# Patient Record
Sex: MALE | Race: WHITE | NOT HISPANIC OR LATINO | Employment: OTHER | URBAN - METROPOLITAN AREA
[De-identification: names, ages, dates, MRNs, and addresses within clinical notes are randomized per-mention and may not be internally consistent; named-entity substitution may affect disease eponyms.]

---

## 2018-12-04 ENCOUNTER — TELEPHONE (OUTPATIENT)
Dept: OTOLARYNGOLOGY | Facility: CLINIC | Age: 63
End: 2018-12-04

## 2018-12-30 ENCOUNTER — OFFICE VISIT (OUTPATIENT)
Dept: URGENT CARE | Facility: CLINIC | Age: 63
End: 2018-12-30

## 2018-12-30 VITALS
WEIGHT: 190 LBS | RESPIRATION RATE: 18 BRPM | HEIGHT: 72 IN | BODY MASS INDEX: 25.73 KG/M2 | HEART RATE: 82 BPM | TEMPERATURE: 98 F | OXYGEN SATURATION: 98 % | DIASTOLIC BLOOD PRESSURE: 82 MMHG | SYSTOLIC BLOOD PRESSURE: 138 MMHG

## 2018-12-30 DIAGNOSIS — J06.9 VIRAL URI WITH COUGH: Primary | ICD-10-CM

## 2018-12-30 PROCEDURE — 99203 OFFICE O/P NEW LOW 30 MIN: CPT | Mod: S$GLB,,, | Performed by: PHYSICIAN ASSISTANT

## 2018-12-30 RX ORDER — HYDROCHLOROTHIAZIDE 12.5 MG/1
CAPSULE ORAL
COMMUNITY
End: 2019-01-30 | Stop reason: SDUPTHER

## 2018-12-30 RX ORDER — BENZONATATE 100 MG/1
100 CAPSULE ORAL EVERY 6 HOURS PRN
Qty: 60 CAPSULE | Refills: 1 | Status: SHIPPED | OUTPATIENT
Start: 2018-12-30 | End: 2019-05-03

## 2018-12-30 RX ORDER — AZELASTINE 1 MG/ML
1 SPRAY, METERED NASAL 2 TIMES DAILY
Qty: 30 ML | Refills: 0 | Status: SHIPPED | OUTPATIENT
Start: 2018-12-30 | End: 2019-05-03

## 2018-12-30 NOTE — PATIENT INSTRUCTIONS
Symptomatic treatment:    Alternate Tylenol and Ibuprofen every 3 hrs  salt water gargles to soothe throat  Honey/lemon water to soothe throat  Cold-eeze helps to reduce the duration of URI symptoms  Elderberry to reduce duration of URI symptoms  Cepachol helps to numb the discomfort in throat  Nasal saline spray reduces inflammation and dryness  Warm face compresses/hot showers as often as you can to open sinuses   Vicks vapor rub at night  Flonase OTC or Nasacort OTC  Simple foods like chicken noodle soup help hydrate  Delsym helps with coughing at night  Zyrtec/Claritin during the day and Benadryl at night may help if allergy component   Zantac will help if there is reflux from the post nasal drip  Rest as much as you can  Your symptoms will likely last 7-10 days, maybe longer depending on how it affects your body.  You are contagious 7-10, so minimize contact with others to reduce the spread to others and stay home from work or school as we discussed. Dehydration is preventable but is one of the main reasons why you will feel so badly. Drink pedialyte, gatorade or propel. Stay hydrated.  Antibiotics are not needed unless a complication(such as Otitis Media, Bacterial sinus infection or pneumonia). Taking antibiotics for Flu/Cold is not supported by evidencebased medicine and can expose you to unnecessary side effects of the medication, such as anaphylaxis.   If you experience any:  Chest pain, shortness of breath, wheezing or difficulty breathing  Severe headache, face, neck or ear pain  New rash  Fever over 101.5º F (38.6 C) for more than three days  Confusion, behavior change or seizure  Severe weakness or dizziness  Go to ER

## 2018-12-30 NOTE — PROGRESS NOTES
Subjective:       Patient ID: Malcom Ho is a 63 y.o. male.    Vitals:  height is 6' (1.829 m) and weight is 86.2 kg (190 lb). His oral temperature is 98.1 °F (36.7 °C). His blood pressure is 138/82 and his pulse is 82. His respiration is 18 and oxygen saturation is 98%.     Chief Complaint: Sore Throat    Pt states off and on cough but sore throat worse      Sore Throat    This is a new problem. The current episode started yesterday. The problem has been gradually worsening. The maximum temperature recorded prior to his arrival was 101 - 101.9 F. Associated symptoms include congestion, coughing and headaches. Pertinent negatives include no ear pain, shortness of breath, stridor or vomiting. He has tried acetaminophen for the symptoms. The treatment provided no relief.       Constitution: Positive for fever. Negative for chills, sweating and fatigue.   HENT: Positive for congestion and sore throat. Negative for ear pain, sinus pain, sinus pressure and voice change.    Neck: Negative for painful lymph nodes.   Eyes: Negative for eye redness.   Respiratory: Positive for cough and sputum production. Negative for chest tightness, bloody sputum, COPD, shortness of breath, stridor, wheezing and asthma.    Gastrointestinal: Negative for nausea and vomiting.   Musculoskeletal: Negative for muscle ache.   Skin: Negative for rash.   Allergic/Immunologic: Negative for seasonal allergies and asthma.   Neurological: Positive for headaches.   Hematologic/Lymphatic: Negative for swollen lymph nodes.       Objective:      Physical Exam   Constitutional: He is oriented to person, place, and time. He appears well-developed and well-nourished. He is cooperative.  Non-toxic appearance. He does not appear ill. No distress.   HENT:   Head: Normocephalic and atraumatic.   Right Ear: Hearing, external ear and ear canal normal. A middle ear effusion (Clear) is present.   Left Ear: Hearing, external ear and ear canal normal. A middle  ear effusion (Clear) is present.   Nose: Nose normal. No mucosal edema, rhinorrhea or nasal deformity. No epistaxis. Right sinus exhibits no maxillary sinus tenderness and no frontal sinus tenderness. Left sinus exhibits no maxillary sinus tenderness and no frontal sinus tenderness.   Mouth/Throat: Uvula is midline and mucous membranes are normal. No trismus in the jaw. Normal dentition. No uvula swelling. Posterior oropharyngeal erythema present.   Eyes: Conjunctivae and lids are normal. No scleral icterus.   Sclera clear bilat   Neck: Trachea normal, full passive range of motion without pain and phonation normal. Neck supple.   Cardiovascular: Normal rate, regular rhythm, normal heart sounds, intact distal pulses and normal pulses.   Pulmonary/Chest: Effort normal and breath sounds normal. No respiratory distress.   Abdominal: Soft. Normal appearance and bowel sounds are normal. He exhibits no distension. There is no tenderness.   Musculoskeletal: Normal range of motion. He exhibits no edema or deformity.   Neurological: He is alert and oriented to person, place, and time. He exhibits normal muscle tone. Coordination normal.   Skin: Skin is warm, dry and intact. He is not diaphoretic. No pallor.   Psychiatric: He has a normal mood and affect. His speech is normal and behavior is normal. Judgment and thought content normal. Cognition and memory are normal.   Nursing note and vitals reviewed.      Assessment:       1. Viral URI with cough        Plan:         Viral URI with cough    Other orders  -     azelastine (ASTELIN) 137 mcg (0.1 %) nasal spray; 1 spray (137 mcg total) by Nasal route 2 (two) times daily.  Dispense: 30 mL; Refill: 0  -     benzonatate (TESSALON PERLES) 100 MG capsule; Take 1 capsule (100 mg total) by mouth every 6 (six) hours as needed.  Dispense: 60 capsule; Refill: 1     Advised to contact me in 1 week if symptoms have not improved.  Patient has glaucoma so we will refrain from steroid  injection.  Discussed symptomatic relief and reasons to return to clinic.    You must understand that you've received an Urgent Care treatment only and that you may be released before all your medical problems are known or treated. You, the patient, will arrange for follow up care as instructed.  Follow up with your PCP or specialty clinic as directed in the next 1-2 weeks if not improved or as needed.  You can call [phone] to schedule an appointment with the appropriate provider.  If your condition worsens we recommend that you receive another evaluation at the emergency room immediately or contact your primary medical clinics after hours call service to discuss your concerns.  Please return here or go to the Emergency Department for any concerns or worsening of condition.

## 2019-01-02 ENCOUNTER — TELEPHONE (OUTPATIENT)
Dept: URGENT CARE | Facility: CLINIC | Age: 64
End: 2019-01-02

## 2019-01-02 NOTE — TELEPHONE ENCOUNTER
Pt states his symptoms are worst. He could not go to work today.  He states he will come in to be re-evaluated today     He states he still have fever, cough and blood in his mucus when he blow is nose

## 2019-01-03 ENCOUNTER — TELEPHONE (OUTPATIENT)
Dept: URGENT CARE | Facility: CLINIC | Age: 64
End: 2019-01-03

## 2019-01-03 RX ORDER — AMOXICILLIN AND CLAVULANATE POTASSIUM 875; 125 MG/1; MG/1
1 TABLET, FILM COATED ORAL 2 TIMES DAILY
Qty: 20 TABLET | Refills: 0 | Status: SHIPPED | OUTPATIENT
Start: 2019-01-03 | End: 2019-01-13

## 2019-03-25 ENCOUNTER — OFFICE VISIT (OUTPATIENT)
Dept: OTOLARYNGOLOGY | Facility: CLINIC | Age: 64
End: 2019-03-25
Payer: COMMERCIAL

## 2019-03-25 ENCOUNTER — CLINICAL SUPPORT (OUTPATIENT)
Dept: AUDIOLOGY | Facility: CLINIC | Age: 64
End: 2019-03-25
Payer: COMMERCIAL

## 2019-03-25 VITALS — BODY MASS INDEX: 25.2 KG/M2 | WEIGHT: 186.06 LBS | HEIGHT: 72 IN

## 2019-03-25 DIAGNOSIS — H90.3 SENSORINEURAL HEARING LOSS, BILATERAL: ICD-10-CM

## 2019-03-25 DIAGNOSIS — H81.8X9 OTHER DISORDERS OF VESTIBULAR FUNCTION, UNSPECIFIED EAR: Primary | ICD-10-CM

## 2019-03-25 DIAGNOSIS — H81.319 AUDITORY VERTIGO, UNSPECIFIED LATERALITY: Primary | ICD-10-CM

## 2019-03-25 DIAGNOSIS — H90.3 SENSORINEURAL HEARING LOSS (SNHL) OF BOTH EARS: Primary | ICD-10-CM

## 2019-03-25 DIAGNOSIS — H93.19 TINNITUS, UNSPECIFIED LATERALITY: ICD-10-CM

## 2019-03-25 DIAGNOSIS — R26.89 BALANCE PROBLEM: ICD-10-CM

## 2019-03-25 PROCEDURE — 99999 PR PBB SHADOW E&M-EST. PATIENT-LVL III: ICD-10-PCS | Mod: PBBFAC,,, | Performed by: OTOLARYNGOLOGY

## 2019-03-25 PROCEDURE — 92540 BASIC VESTIBULAR EVALUATION: CPT | Mod: S$GLB,,, | Performed by: AUDIOLOGIST

## 2019-03-25 PROCEDURE — 92557 COMPREHENSIVE HEARING TEST: CPT | Mod: S$GLB,,, | Performed by: AUDIOLOGIST

## 2019-03-25 PROCEDURE — 92567 TYMPANOMETRY: CPT | Mod: S$GLB,,, | Performed by: AUDIOLOGIST

## 2019-03-25 PROCEDURE — 3008F PR BODY MASS INDEX (BMI) DOCUMENTED: ICD-10-PCS | Mod: CPTII,S$GLB,, | Performed by: OTOLARYNGOLOGY

## 2019-03-25 PROCEDURE — 3008F BODY MASS INDEX DOCD: CPT | Mod: CPTII,S$GLB,, | Performed by: OTOLARYNGOLOGY

## 2019-03-25 PROCEDURE — 92557 PR COMPREHENSIVE HEARING TEST: ICD-10-PCS | Mod: S$GLB,,, | Performed by: AUDIOLOGIST

## 2019-03-25 PROCEDURE — 92540 PR VESTIBULAR EVAL NYSTAG FOVL&PERPH STIM OSCIL TRACKING: ICD-10-PCS | Mod: S$GLB,,, | Performed by: AUDIOLOGIST

## 2019-03-25 PROCEDURE — 92537 PR CALORIC VSTBLR TEST W/REC BITHERMAL: ICD-10-PCS | Mod: S$GLB,,, | Performed by: AUDIOLOGIST

## 2019-03-25 PROCEDURE — 92537 CALORIC VSTBLR TEST W/REC: CPT | Mod: S$GLB,,, | Performed by: AUDIOLOGIST

## 2019-03-25 PROCEDURE — 99205 OFFICE O/P NEW HI 60 MIN: CPT | Mod: S$GLB,,, | Performed by: OTOLARYNGOLOGY

## 2019-03-25 PROCEDURE — 92567 PR TYMPA2METRY: ICD-10-PCS | Mod: S$GLB,,, | Performed by: AUDIOLOGIST

## 2019-03-25 PROCEDURE — 99205 PR OFFICE/OUTPT VISIT, NEW, LEVL V, 60-74 MIN: ICD-10-PCS | Mod: S$GLB,,, | Performed by: OTOLARYNGOLOGY

## 2019-03-25 PROCEDURE — 99999 PR PBB SHADOW E&M-EST. PATIENT-LVL III: CPT | Mod: PBBFAC,,, | Performed by: OTOLARYNGOLOGY

## 2019-03-25 NOTE — PROGRESS NOTES
VNG Evaluation    Referring physician:  Dr. Mac    63 y.o. male complains of imbalance, muffled hearing and occasional tinnitus. Symptoms of imbalance occur spontaneously. Symptoms have been recurring since 2018. On 2018, the patient was on a flight that had to make an emergency landing. He reported that on the flight he experienced extreme head and ear pain and has since been experiencing imbalance, muffled hearing and occasional tinnitus.     Gaze nystagmus was absent.  Oculomotor function was normal.  Spontaneous nystagmus was absent.  The head-hanging left Hallpike was negative.  The head-hanging right Hallpike was negative.  Static positional nystagmus was absent.  Unilateral weakness: 4% (left)  Directional preponderance 1% (left-beating)  RC: 16 d/s   d/s  RW: 22 d/s  LW: 21 d/s  Fixation suppression was normal.    Impression: VNG WNL; no evidence of vestibular system dysfunction including BPPV.    Recommend: 1. A trial with Cawthorne exercises for subjective symptoms. A copy of these exercises was provided for the patient at today's appointment; however, their value is unknown due to normal VNG findings.

## 2019-03-25 NOTE — LETTER
March 25, 2019      Patel Neal, DO  [address] [place] [id number]           [facility]  [address]  [facility] [address]  Phone: [phone]  Fax: [phone]          Patient: Malcom Ho   MR Number: [id number]   YOB: 1955   Date of Visit: 3/25/2019       Dear Dr. Patel Neal:    Thank you for referring Malcom Ho to me for evaluation. Attached you will find relevant portions of my assessment and plan of care.    If you have questions, please do not hesitate to call me. I look forward to following Malcom Ho along with you.    Sincerely,    Maxwell Mac MD    Enclosure  CC:  No Recipients    If you would like to receive this communication electronically, please contact [email or web address] or [phone] to request more information on [facility] [employer] access.    For providers and/or their staff who would like to refer a patient to [facility], please contact us through our one-stop-shop provider referral line, [facility], at [phone].    If you feel you have received this communication in error or would no longer like to receive these types of communications, please e-mail [email or web address]

## 2019-03-25 NOTE — PROGRESS NOTES
"Subjective:       Patient ID: Malcom Ho is a 63 y.o. male.    Chief Complaint: Otalgia    HPI : Ref Dr Aguilar    63 year old male who presents to the otology clinic as a new patient with complaint of occ. decreased hearing, tinnitus, and off-balance feeling since December 2018.  He reports an emergency plane landing in December during which he had an excruciating bilateral headache and ear pain which subsided shortly after landing.  Since then he feels that his hearing is decreased, he hears occasional high-pitched buzzing tinnitus that is more pronounced in quiet environments, and the occasional feeling of being off-balance.  He has not had headaches since the event.  He denies a history of migraine headaches, denies a family history of migraines although his daughter does have occasional migraine headache.  He is unable to give a trigger for the off-balance feeling, specifically he denies light, head movement, or standing up as triggers.  He feels that he will occasionally note while walking that he is pushed to one side or the other, as if by "ghosts". Happens 1-2 x/ week. Gradually improving.    Review of Systems   Constitutional: Negative.  Negative for activity change, appetite change, chills, diaphoresis and fatigue.   HENT: Negative for congestion, dental problem, facial swelling, sinus pressure, sinus pain and voice change.    Eyes: Negative.  Negative for pain and discharge.   Respiratory: Negative.  Negative for apnea and stridor.    Cardiovascular: Negative.  Negative for chest pain.   Gastrointestinal: Negative.  Negative for nausea and vomiting.   Endocrine: Negative.    Genitourinary: Negative.    Musculoskeletal: Negative for arthralgias, gait problem and neck stiffness.   Skin: Negative for rash and wound.   Allergic/Immunologic: Negative.    Neurological: Negative.  Negative for seizures and syncope.   Hematological: Negative.  Negative for adenopathy. Does not bruise/bleed easily. "   Psychiatric/Behavioral: Negative for behavioral problems and confusion.       Objective:      Physical Exam   Constitutional: He appears well-developed and well-nourished. No distress.   HENT:   Head: Normocephalic and atraumatic.   Right Ear: Tympanic membrane, external ear and ear canal normal. No drainage, swelling or tenderness. Tympanic membrane is not perforated. No middle ear effusion.   Left Ear: Tympanic membrane, external ear and ear canal normal. No drainage, swelling or tenderness. Tympanic membrane is not perforated.  No middle ear effusion.   Nose: Nose normal. No mucosal edema or rhinorrhea.   Mouth/Throat: Uvula is midline and oropharynx is clear and moist. No posterior oropharyngeal edema or posterior oropharyngeal erythema.   CN II-XII intact  Romberg negative  Gait and coordination WNL   Eyes:   EOMI  PERRL  No nystagmus         VNG reviewed, results normal  VNG Evaluation     Referring physician:  Dr. Mac     63 y.o. male complains of imbalance, muffled hearing and occasional tinnitus. Symptoms of imbalance occur spontaneously. Symptoms have been recurring since 2018. On 2018, the patient was on a flight that had to make an emergency landing. He reported that on the flight he experienced extreme head and ear pain and has since been experiencing imbalance, muffled hearing and occasional tinnitus.      Gaze nystagmus was absent.  Oculomotor function was normal.  Spontaneous nystagmus was absent.  The head-hanging left Hallpike was negative.  The head-hanging right Hallpike was negative.  Static positional nystagmus was absent.  Unilateral weakness: 4% (left)  Directional preponderance 1% (left-beating)  RC: 16 d/s   d/s  RW: 22 d/s  LW: 21 d/s  Fixation suppression was normal.     Impression: VNG WNL; no evidence of vestibular system dysfunction including BPPV.         Assessment:       1. Sensorineural hearing loss (SNHL) of both ears    2. Tinnitus, unspecified  laterality    3. Balance problem ? Migraine/ Central dizz       Plan:     No acute findings on exam or VNG    Recommend trial of Cawthorne exercises    RTC PRN/ Consider MRI, Neuro consult if worsens or fails to improve.

## 2019-05-07 PROBLEM — S46.212A: Status: ACTIVE | Noted: 2019-05-07

## 2019-05-07 PROBLEM — M75.22 BICIPITAL TENDINITIS OF LEFT SHOULDER: Status: ACTIVE | Noted: 2019-05-07

## 2019-05-13 PROBLEM — M66.322 NONTRAUMATIC RUPTURE OF LEFT LONG HEAD BICEPS TENDON: Status: ACTIVE | Noted: 2019-05-13

## 2019-06-09 ENCOUNTER — OFFICE VISIT (OUTPATIENT)
Dept: URGENT CARE | Facility: CLINIC | Age: 64
End: 2019-06-09
Payer: COMMERCIAL

## 2019-06-09 VITALS
RESPIRATION RATE: 18 BRPM | HEART RATE: 68 BPM | TEMPERATURE: 98 F | DIASTOLIC BLOOD PRESSURE: 95 MMHG | BODY MASS INDEX: 25.73 KG/M2 | HEIGHT: 72 IN | WEIGHT: 190 LBS | OXYGEN SATURATION: 97 % | SYSTOLIC BLOOD PRESSURE: 160 MMHG
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
## 2019-06-09 PROCEDURE — 96372 PR INJECTION,THERAP/PROPH/DIAG2ST, IM OR SUBCUT: ICD-10-PCS | Mod: S$GLB,,, | Performed by: PHYSICIAN ASSISTANT

## 2019-06-09 PROCEDURE — 96372 THER/PROPH/DIAG INJ SC/IM: CPT | Mod: S$GLB,,, | Performed by: PHYSICIAN ASSISTANT

## 2019-06-09 PROCEDURE — 3077F PR MOST RECENT SYSTOLIC BLOOD PRESSURE >= 140 MM HG: ICD-10-PCS | Mod: CPTII,S$GLB,, | Performed by: PHYSICIAN ASSISTANT

## 2019-06-09 PROCEDURE — 3080F PR MOST RECENT DIASTOLIC BLOOD PRESSURE >= 90 MM HG: ICD-10-PCS | Mod: CPTII,S$GLB,, | Performed by: PHYSICIAN ASSISTANT

## 2019-06-09 PROCEDURE — 99214 OFFICE O/P EST MOD 30 MIN: CPT | Mod: 25,S$GLB,, | Performed by: PHYSICIAN ASSISTANT

## 2019-06-09 PROCEDURE — 3077F SYST BP >= 140 MM HG: CPT | Mod: CPTII,S$GLB,, | Performed by: PHYSICIAN ASSISTANT

## 2019-06-09 PROCEDURE — 3080F DIAST BP >= 90 MM HG: CPT | Mod: CPTII,S$GLB,, | Performed by: PHYSICIAN ASSISTANT

## 2019-06-09 PROCEDURE — 99214 PR OFFICE/OUTPT VISIT, EST, LEVL IV, 30-39 MIN: ICD-10-PCS | Mod: 25,S$GLB,, | Performed by: PHYSICIAN ASSISTANT

## 2019-06-09 RX ORDER — DEXAMETHASONE SODIUM PHOSPHATE 100 MG/10ML
10 INJECTION INTRAMUSCULAR; INTRAVENOUS
Status: COMPLETED | OUTPATIENT
Start: 2019-06-09 | End: 2019-06-09

## 2019-06-09 RX ORDER — PREDNISONE 20 MG/1
20 TABLET ORAL DAILY
Qty: 5 TABLET | Refills: 0 | Status: SHIPPED | OUTPATIENT
Start: 2019-06-09 | End: 2019-06-14

## 2019-06-09 RX ADMIN — DEXAMETHASONE SODIUM PHOSPHATE 10 MG: 100 INJECTION INTRAMUSCULAR; INTRAVENOUS at 11:06

## 2019-06-09 NOTE — PATIENT INSTRUCTIONS

## 2019-06-09 NOTE — PROGRESS NOTES
Subjective:       Patient ID: Malcom Ho is a 63 y.o. male.    Vitals:  height is 6' (1.829 m) and weight is 86.2 kg (190 lb). His oral temperature is 97.9 °F (36.6 °C). His blood pressure is 160/95 (abnormal) and his pulse is 68. His respiration is 18 and oxygen saturation is 97%.     Chief Complaint: Insect Bite    Pt presents today with insect bite to the left hand, pt states he was stung twice by a wasp on his left hand, he has taken Benadryl and put benadryl cream on it, pt states it has doubled in size since yesterday. He used ice on it this am    Insect Bite   This is a new problem. The current episode started yesterday. The problem occurs constantly. The problem has been gradually worsening. Pertinent negatives include no arthralgias, chest pain, chills, congestion, coughing, fatigue, fever, headaches, joint swelling, myalgias, nausea, rash, sore throat, vertigo or vomiting. Nothing aggravates the symptoms. He has tried ice (benadryl tablet and cream) for the symptoms. The treatment provided no relief.       Constitution: Negative for chills, fatigue and fever.   HENT: Negative for congestion and sore throat.    Neck: Negative for painful lymph nodes.   Cardiovascular: Negative for chest pain and leg swelling.   Eyes: Negative for double vision and blurred vision.   Respiratory: Negative for cough and shortness of breath.    Gastrointestinal: Negative for nausea, vomiting and diarrhea.   Genitourinary: Negative for dysuria, frequency and urgency.   Musculoskeletal: Negative for joint pain, joint swelling, muscle cramps and muscle ache.   Skin: Positive for color change and erythema. Negative for pale and rash.        edema   Allergic/Immunologic: Positive for itching. Negative for seasonal allergies.   Neurological: Negative for dizziness, history of vertigo, light-headedness, passing out and headaches.   Hematologic/Lymphatic: Negative for swollen lymph nodes, easy bruising/bleeding and history of  blood clots. Does not bruise/bleed easily.   Psychiatric/Behavioral: Negative for nervous/anxious, sleep disturbance and depression. The patient is not nervous/anxious.        Objective:      Physical Exam   Constitutional: He is oriented to person, place, and time. He appears well-developed and well-nourished. No distress.   HENT:   Head: Normocephalic and atraumatic.   Right Ear: External ear normal.   Left Ear: External ear normal.   Nose: Nose normal.   Eyes: Conjunctivae, EOM and lids are normal.   Neck: Trachea normal, full passive range of motion without pain and phonation normal. Neck supple.   Pulmonary/Chest: Effort normal.   Musculoskeletal: Normal range of motion.   Neurological: He is alert and oriented to person, place, and time.   Skin: Skin is warm, dry and intact. Capillary refill takes less than 2 seconds. He is not diaphoretic. There is erythema.   Left hand swelling.  Mild erythema at punctate site.  Capillary refill less than 2 sec and normal sensation distally.   Psychiatric: He has a normal mood and affect. His speech is normal and behavior is normal. Judgment and thought content normal. Cognition and memory are normal.   Nursing note and vitals reviewed.      Assessment:       1. Insect bite, initial encounter        Plan:         Insect bite, initial encounter    Other orders  -     dexamethasone injection 10 mg  -     predniSONE (DELTASONE) 20 MG tablet; Take 1 tablet (20 mg total) by mouth once daily. for 5 days  Dispense: 5 tablet; Refill: 0     Discussed precautions and reasons to go to ED such as numbness in the fingers or change in color.  Discussed steroid injection low-dose steroid with patient.  He states he has had steroids in the past without issue.  Discussed risks versus benefits.    You must understand that you've received an Urgent Care treatment only and that you may be released before all your medical problems are known or treated. You, the patient, will arrange for follow  up care as instructed.  Follow up with your PCP or specialty clinic as directed in the next 1-2 weeks if not improved or as needed.  You can call [phone] to schedule an appointment with the appropriate provider.  If your condition worsens we recommend that you receive another evaluation at the emergency room immediately or contact your primary medical clinics after hours call service to discuss your concerns.  Please return here or go to the Emergency Department for any concerns or worsening of condition.

## 2019-06-12 ENCOUNTER — TELEPHONE (OUTPATIENT)
Dept: URGENT CARE | Facility: CLINIC | Age: 64
End: 2019-06-12

## 2022-07-13 ENCOUNTER — TELEPHONE (OUTPATIENT)
Dept: GASTROENTEROLOGY | Facility: CLINIC | Age: 67
End: 2022-07-13
Payer: MEDICARE

## 2022-07-13 NOTE — TELEPHONE ENCOUNTER
Called and spoke with the patient, patient requested to set up a colonoscopy on a Monday, procedure scheduled, prep instructions sent to patient via my chart message, patient verbalized understanding of this.

## 2022-08-06 ENCOUNTER — OFFICE VISIT (OUTPATIENT)
Dept: URGENT CARE | Facility: CLINIC | Age: 67
End: 2022-08-06
Payer: MEDICARE

## 2022-08-06 VITALS
TEMPERATURE: 98 F | HEIGHT: 72 IN | RESPIRATION RATE: 16 BRPM | DIASTOLIC BLOOD PRESSURE: 71 MMHG | HEART RATE: 73 BPM | SYSTOLIC BLOOD PRESSURE: 121 MMHG | OXYGEN SATURATION: 95 % | WEIGHT: 194 LBS | BODY MASS INDEX: 26.28 KG/M2

## 2022-08-06 DIAGNOSIS — T14.8XXA ABRASION: Primary | ICD-10-CM

## 2022-08-06 PROCEDURE — 1159F MED LIST DOCD IN RCRD: CPT | Mod: CPTII,S$GLB,,

## 2022-08-06 PROCEDURE — 3074F SYST BP LT 130 MM HG: CPT | Mod: CPTII,S$GLB,,

## 2022-08-06 PROCEDURE — 3008F BODY MASS INDEX DOCD: CPT | Mod: CPTII,S$GLB,,

## 2022-08-06 PROCEDURE — 99214 PR OFFICE/OUTPT VISIT, EST, LEVL IV, 30-39 MIN: ICD-10-PCS | Mod: S$GLB,,,

## 2022-08-06 PROCEDURE — 3074F PR MOST RECENT SYSTOLIC BLOOD PRESSURE < 130 MM HG: ICD-10-PCS | Mod: CPTII,S$GLB,,

## 2022-08-06 PROCEDURE — 99214 OFFICE O/P EST MOD 30 MIN: CPT | Mod: S$GLB,,,

## 2022-08-06 PROCEDURE — 1160F RVW MEDS BY RX/DR IN RCRD: CPT | Mod: CPTII,S$GLB,,

## 2022-08-06 PROCEDURE — 1126F AMNT PAIN NOTED NONE PRSNT: CPT | Mod: CPTII,S$GLB,,

## 2022-08-06 PROCEDURE — 1160F PR REVIEW ALL MEDS BY PRESCRIBER/CLIN PHARMACIST DOCUMENTED: ICD-10-PCS | Mod: CPTII,S$GLB,,

## 2022-08-06 PROCEDURE — 3078F DIAST BP <80 MM HG: CPT | Mod: CPTII,S$GLB,,

## 2022-08-06 PROCEDURE — 1126F PR PAIN SEVERITY QUANTIFIED, NO PAIN PRESENT: ICD-10-PCS | Mod: CPTII,S$GLB,,

## 2022-08-06 PROCEDURE — 1159F PR MEDICATION LIST DOCUMENTED IN MEDICAL RECORD: ICD-10-PCS | Mod: CPTII,S$GLB,,

## 2022-08-06 PROCEDURE — 3078F PR MOST RECENT DIASTOLIC BLOOD PRESSURE < 80 MM HG: ICD-10-PCS | Mod: CPTII,S$GLB,,

## 2022-08-06 PROCEDURE — 3008F PR BODY MASS INDEX (BMI) DOCUMENTED: ICD-10-PCS | Mod: CPTII,S$GLB,,

## 2022-08-06 RX ORDER — MUPIROCIN 20 MG/G
OINTMENT TOPICAL 2 TIMES DAILY
Qty: 1 G | Refills: 0 | Status: SHIPPED | OUTPATIENT
Start: 2022-08-06 | End: 2022-08-20

## 2022-08-06 NOTE — PATIENT INSTRUCTIONS
Gently clean wound with soap and water at least twice daily unless more frequently soiled, then clean more frequently.    May apply topical antibiotic (avoid neosporin) to wound to promote healing.   Signs of infection include:  Increase in redness, increase in pain, purulent (pus) drainage. Contact clinic if infection concerns arise.   Do not use hydrogen peroxide to wound.  Apply Bactroban with Q-tip to Dair.

## 2022-08-06 NOTE — PROGRESS NOTES
Subjective:       Patient ID: Malcom Ho is a 66 y.o. male.    Vitals:  height is 6' (1.829 m) and weight is 88 kg (194 lb). His oral temperature is 98.2 °F (36.8 °C). His blood pressure is 121/71 and his pulse is 73. His respiration is 16 and oxygen saturation is 95%.     Chief Complaint: Sore    Patient presents today with sore in nose (left nostril), that patient first noticed about 5 days ago.  Patient states it is getting increasingly more painful.  Patient has used hydrogen peroxide and neosporin, with no relief.     Other  This is a new problem. The current episode started in the past 7 days. The problem has been gradually worsening. Pertinent negatives include no abdominal pain, chest pain, chills, congestion, diaphoresis, fatigue, fever, nausea, neck pain, rash, sore throat, vertigo or vomiting.       Constitution: Negative. Negative for activity change, appetite change, chills, sweating, fatigue, fever, generalized weakness and international travel in last 60 days.   HENT: Positive for facial trauma. Negative for ear pain, ear discharge, facial swelling, congestion, nosebleeds, foreign body in nose, postnasal drip, sinus pain, sinus pressure, sore throat, trouble swallowing and voice change.         Left are sore after cutting self shaving no stairs.   Neck: neck negative. Negative for neck pain, neck stiffness and painful lymph nodes.   Cardiovascular: Negative.  Negative for chest pain, leg swelling, palpitations and sob on exertion.   Gastrointestinal: Negative.  Negative for abdominal pain, nausea, vomiting, constipation and diarrhea.   Endocrine: negative. hair loss and cold intolerance.   Genitourinary: Negative.    Musculoskeletal: Negative.  Negative for pain and back pain.   Skin: Positive for wound. Negative for rash, erythema and hives.        Sore and left nare   Allergic/Immunologic: Negative for hives.   Neurological: Negative.  Negative for dizziness, history of vertigo,  light-headedness, disorientation and altered mental status.   Hematologic/Lymphatic: Negative.  Negative for swollen lymph nodes and easy bruising/bleeding. Does not bruise/bleed easily.   Psychiatric/Behavioral: Negative.  Negative for altered mental status, disorientation and confusion.       Objective:      Physical Exam   Constitutional: He is oriented to person, place, and time. He appears well-developed.   HENT:   Head: Normocephalic and atraumatic. Head is without abrasion, without contusion and without laceration.   Ears:   Right Ear: External ear normal.   Left Ear: External ear normal.   Nose: Nose normal.   Mouth/Throat: Oropharynx is clear and moist and mucous membranes are normal.   Eyes: Conjunctivae, EOM and lids are normal. Pupils are equal, round, and reactive to light.   Neck: Trachea normal and phonation normal. Neck supple.   Cardiovascular: Normal rate, regular rhythm and normal heart sounds.   Pulmonary/Chest: Effort normal and breath sounds normal. No stridor. No respiratory distress.   Musculoskeletal: Normal range of motion.         General: Normal range of motion.   Neurological: He is alert and oriented to person, place, and time.   Skin: Skin is warm, dry and no rash. Capillary refill takes less than 2 seconds. abrasion (Patient with less than 1 cm wound with white pus noted to anterior L nare ) No burn, No bruising, No erythema and No ecchymosis No clubbing and No cyanosis    Psychiatric: His speech is normal and behavior is normal. Judgment and thought content normal.   Nursing note and vitals reviewed.        Assessment:       1. Abrasion          Plan:         Abrasion    Other orders  -     mupirocin (BACTROBAN) 2 % ointment; Apply topically 2 (two) times daily. for 14 days  Dispense: 1 g; Refill: 0           Medical Decision Making:   Initial Assessment:   PT in room AAX4, skin W/D, resp E/U, non toxic in appearance, NAD.    Urgent Care Management:  Discussed to no use hydrogen  peroxide, and Bactroban 2 times daily on a qtip.  Discussed to monitor for increased infection such as redness and pain.  Discussed patient can take Tylenol or Motrin as directed by medication label.  Discuss if this gets does not improve in 2-3 days to come back to clinic or follow up PCP.  Patient agrees with treatment plan and verbalizes understanding.  Patient ambulatory clinic in no acute distress

## 2022-08-25 ENCOUNTER — OFFICE VISIT (OUTPATIENT)
Dept: URGENT CARE | Facility: CLINIC | Age: 67
End: 2022-08-25
Payer: MEDICARE

## 2022-08-25 VITALS
WEIGHT: 194 LBS | OXYGEN SATURATION: 97 % | RESPIRATION RATE: 16 BRPM | DIASTOLIC BLOOD PRESSURE: 79 MMHG | HEART RATE: 68 BPM | HEIGHT: 72 IN | BODY MASS INDEX: 26.28 KG/M2 | SYSTOLIC BLOOD PRESSURE: 133 MMHG | TEMPERATURE: 98 F

## 2022-08-25 DIAGNOSIS — J02.9 SORE THROAT: Primary | ICD-10-CM

## 2022-08-25 LAB
CTP QC/QA: YES
MOLECULAR STREP A: NEGATIVE

## 2022-08-25 PROCEDURE — 3008F PR BODY MASS INDEX (BMI) DOCUMENTED: ICD-10-PCS | Mod: CPTII,S$GLB,, | Performed by: EMERGENCY MEDICINE

## 2022-08-25 PROCEDURE — 3075F SYST BP GE 130 - 139MM HG: CPT | Mod: CPTII,S$GLB,, | Performed by: EMERGENCY MEDICINE

## 2022-08-25 PROCEDURE — 1126F AMNT PAIN NOTED NONE PRSNT: CPT | Mod: CPTII,S$GLB,, | Performed by: EMERGENCY MEDICINE

## 2022-08-25 PROCEDURE — 1160F RVW MEDS BY RX/DR IN RCRD: CPT | Mod: CPTII,S$GLB,, | Performed by: EMERGENCY MEDICINE

## 2022-08-25 PROCEDURE — 1160F PR REVIEW ALL MEDS BY PRESCRIBER/CLIN PHARMACIST DOCUMENTED: ICD-10-PCS | Mod: CPTII,S$GLB,, | Performed by: EMERGENCY MEDICINE

## 2022-08-25 PROCEDURE — 99213 PR OFFICE/OUTPT VISIT, EST, LEVL III, 20-29 MIN: ICD-10-PCS | Mod: S$GLB,,, | Performed by: EMERGENCY MEDICINE

## 2022-08-25 PROCEDURE — 3008F BODY MASS INDEX DOCD: CPT | Mod: CPTII,S$GLB,, | Performed by: EMERGENCY MEDICINE

## 2022-08-25 PROCEDURE — 1159F PR MEDICATION LIST DOCUMENTED IN MEDICAL RECORD: ICD-10-PCS | Mod: CPTII,S$GLB,, | Performed by: EMERGENCY MEDICINE

## 2022-08-25 PROCEDURE — 1159F MED LIST DOCD IN RCRD: CPT | Mod: CPTII,S$GLB,, | Performed by: EMERGENCY MEDICINE

## 2022-08-25 PROCEDURE — 87651 POCT STREP A MOLECULAR: ICD-10-PCS | Mod: QW,S$GLB,, | Performed by: EMERGENCY MEDICINE

## 2022-08-25 PROCEDURE — 3078F PR MOST RECENT DIASTOLIC BLOOD PRESSURE < 80 MM HG: ICD-10-PCS | Mod: CPTII,S$GLB,, | Performed by: EMERGENCY MEDICINE

## 2022-08-25 PROCEDURE — 1126F PR PAIN SEVERITY QUANTIFIED, NO PAIN PRESENT: ICD-10-PCS | Mod: CPTII,S$GLB,, | Performed by: EMERGENCY MEDICINE

## 2022-08-25 PROCEDURE — 87651 STREP A DNA AMP PROBE: CPT | Mod: QW,S$GLB,, | Performed by: EMERGENCY MEDICINE

## 2022-08-25 PROCEDURE — 99213 OFFICE O/P EST LOW 20 MIN: CPT | Mod: S$GLB,,, | Performed by: EMERGENCY MEDICINE

## 2022-08-25 PROCEDURE — 3078F DIAST BP <80 MM HG: CPT | Mod: CPTII,S$GLB,, | Performed by: EMERGENCY MEDICINE

## 2022-08-25 PROCEDURE — 3075F PR MOST RECENT SYSTOLIC BLOOD PRESS GE 130-139MM HG: ICD-10-PCS | Mod: CPTII,S$GLB,, | Performed by: EMERGENCY MEDICINE

## 2022-08-25 RX ORDER — CEFDINIR 300 MG/1
300 CAPSULE ORAL 2 TIMES DAILY
Qty: 20 CAPSULE | Refills: 0 | Status: SHIPPED | OUTPATIENT
Start: 2022-08-25 | End: 2022-09-04

## 2022-08-25 NOTE — PROGRESS NOTES
Subjective:       Patient ID: Malcom Ho is a 66 y.o. male.    Vitals:  height is 6' (1.829 m) and weight is 88 kg (194 lb). His temperature is 97.5 °F (36.4 °C). His blood pressure is 133/79 and his pulse is 68. His respiration is 16 and oxygen saturation is 97%.     Chief Complaint: Sore Throat    Patient presents to clinic with a sore throat, symptoms started this morning. Patient has a scratchy sensation in the back of throat and postnasal drip.  Positive Strep exposure.     Sore Throat   This is a new problem. The current episode started today. The problem has been unchanged. Neither side of throat is experiencing more pain than the other. There has been no fever. The pain is at a severity of 0/10. Associated symptoms include swollen glands and trouble swallowing. He has had exposure to strep. He has tried nothing for the symptoms.       HENT: Positive for postnasal drip, sore throat and trouble swallowing.        Objective:      Physical Exam   Constitutional: He is oriented to person, place, and time. He appears well-developed. He is cooperative.  Non-toxic appearance. He does not appear ill. No distress.   HENT:   Head: Normocephalic and atraumatic.   Ears:   Right Ear: Hearing and external ear normal.   Left Ear: Hearing and external ear normal.   Nose: Nose normal. No mucosal edema, rhinorrhea or nasal deformity. No epistaxis. Right sinus exhibits no maxillary sinus tenderness and no frontal sinus tenderness. Left sinus exhibits no maxillary sinus tenderness and no frontal sinus tenderness.   Mouth/Throat: Uvula is midline, oropharynx is clear and moist and mucous membranes are normal. Mucous membranes are moist. No trismus in the jaw. Normal dentition. No uvula swelling. No oropharyngeal exudate, posterior oropharyngeal edema or posterior oropharyngeal erythema. Oropharynx is clear.   Eyes: Conjunctivae and lids are normal. No scleral icterus.   Neck: Trachea normal and phonation normal. Neck supple.  No edema present. No erythema present. No neck rigidity present.   Cardiovascular: Normal rate, regular rhythm, normal heart sounds and normal pulses.   Pulmonary/Chest: Effort normal and breath sounds normal. No respiratory distress. He has no decreased breath sounds. He has no wheezes. He has no rhonchi. He has no rales.   Abdominal: Normal appearance.   Musculoskeletal: Normal range of motion.         General: No deformity. Normal range of motion.   Neurological: He is alert and oriented to person, place, and time. He exhibits normal muscle tone. Coordination normal.   Skin: Skin is warm, dry, intact, not diaphoretic and not pale.   Psychiatric: His speech is normal and behavior is normal. Judgment and thought content normal.   Nursing note and vitals reviewed.      Results for orders placed or performed in visit on 08/25/22   POCT Strep A, Molecular   Result Value Ref Range    Molecular Strep A, POC Negative Negative     Acceptable Yes      Strep test is negative.  Patient says he has family members with strep and he is planning on going to [place] tomorrow.  He would like a prescription so that he could fill it and if he develops high fever he will have a way to treat himself.  I agree with this plan.  He will not begin antibiotics unless he develops significant fever.      Assessment:       1. Sore throat          Plan:         Sore throat  -     POCT Strep A, Molecular  -     cefdinir (OMNICEF) 300 MG capsule; Take 1 capsule (300 mg total) by mouth 2 (two) times daily. for 10 days  Dispense: 20 capsule; Refill: 0

## 2022-09-30 ENCOUNTER — OFFICE VISIT (OUTPATIENT)
Dept: ORTHOPEDICS | Facility: CLINIC | Age: 67
End: 2022-09-30
Payer: MEDICARE

## 2022-09-30 ENCOUNTER — HOSPITAL ENCOUNTER (OUTPATIENT)
Dept: RADIOLOGY | Facility: HOSPITAL | Age: 67
Discharge: HOME | End: 2022-09-30
Attending: ORTHOPAEDIC SURGERY
Payer: MEDICARE

## 2022-09-30 DIAGNOSIS — M25.579 ANKLE PAIN, UNSPECIFIED CHRONICITY, UNSPECIFIED LATERALITY: ICD-10-CM

## 2022-09-30 DIAGNOSIS — M25.571 RIGHT ANKLE PAIN: ICD-10-CM

## 2022-09-30 DIAGNOSIS — M25.871 ANKLE IMPINGEMENT SYNDROME, RIGHT: ICD-10-CM

## 2022-09-30 DIAGNOSIS — M79.671 FOOT PAIN, RIGHT: Primary | ICD-10-CM

## 2022-09-30 DIAGNOSIS — M79.671 FOOT PAIN, RIGHT: ICD-10-CM

## 2022-09-30 DIAGNOSIS — S86.011A PARTIAL ACHILLES TENDON TEAR, RIGHT, INITIAL ENCOUNTER: Primary | ICD-10-CM

## 2022-09-30 PROCEDURE — 73630 X-RAY EXAM OF FOOT: CPT | Mod: 26,RT,, | Performed by: RADIOLOGY

## 2022-09-30 PROCEDURE — 73610 XR ANKLE COMPLETE 3 VIEW RIGHT: ICD-10-PCS | Mod: 26,RT,, | Performed by: RADIOLOGY

## 2022-09-30 PROCEDURE — 1160F PR REVIEW ALL MEDS BY PRESCRIBER/CLIN PHARMACIST DOCUMENTED: ICD-10-PCS | Mod: CPTII,S$GLB,, | Performed by: ORTHOPAEDIC SURGERY

## 2022-09-30 PROCEDURE — 99999 PR PBB SHADOW E&M-EST. PATIENT-LVL III: CPT | Mod: PBBFAC,,, | Performed by: ORTHOPAEDIC SURGERY

## 2022-09-30 PROCEDURE — 99203 PR OFFICE/OUTPT VISIT, NEW, LEVL III, 30-44 MIN: ICD-10-PCS | Mod: S$GLB,,, | Performed by: ORTHOPAEDIC SURGERY

## 2022-09-30 PROCEDURE — 73630 X-RAY EXAM OF FOOT: CPT | Mod: TC,PO,RT

## 2022-09-30 PROCEDURE — 99999 PR PBB SHADOW E&M-EST. PATIENT-LVL III: ICD-10-PCS | Mod: PBBFAC,,, | Performed by: ORTHOPAEDIC SURGERY

## 2022-09-30 PROCEDURE — 73630 XR FOOT COMPLETE 3 VIEW RIGHT: ICD-10-PCS | Mod: 26,RT,, | Performed by: RADIOLOGY

## 2022-09-30 PROCEDURE — 99203 OFFICE O/P NEW LOW 30 MIN: CPT | Mod: S$GLB,,, | Performed by: ORTHOPAEDIC SURGERY

## 2022-09-30 PROCEDURE — 73610 X-RAY EXAM OF ANKLE: CPT | Mod: TC,PO,RT

## 2022-09-30 PROCEDURE — 1159F PR MEDICATION LIST DOCUMENTED IN MEDICAL RECORD: ICD-10-PCS | Mod: CPTII,S$GLB,, | Performed by: ORTHOPAEDIC SURGERY

## 2022-09-30 PROCEDURE — 73610 X-RAY EXAM OF ANKLE: CPT | Mod: 26,RT,, | Performed by: RADIOLOGY

## 2022-09-30 PROCEDURE — 1159F MED LIST DOCD IN RCRD: CPT | Mod: CPTII,S$GLB,, | Performed by: ORTHOPAEDIC SURGERY

## 2022-09-30 PROCEDURE — 1160F RVW MEDS BY RX/DR IN RCRD: CPT | Mod: CPTII,S$GLB,, | Performed by: ORTHOPAEDIC SURGERY

## 2022-09-30 NOTE — PROGRESS NOTES
Status/Diagnosis: Right partial achilles tendon tear +/- posterior impingement.  Date of Surgery: N/A  Date of Injury: N/A; Symptoms onset Mid-September 2022.  Return visit: 2 weeks (after MRI)  X-rays on Return: None.    Present History:  Mr. Ho is a 66 y.o. male who presents today for evaluation of a 6 week history of right posterior ankle pain. Denies any history of injury. Pain worst in AM upon taking first steps. Does have PMH significant for what he describes as a partial achilles tendon tear ~6 years prior. Improved with 8 weeks of boot immobilization followed by physical therapy. Endorses swelling and pain worst with extremes of plantarflexion. Mild to moderate symptom relief with patient initiated night splint use. No recent history of immobilization or oral NSAID use. Exercises regularly walking 1+mi/day. Denies history of tobacco use.       Past Medical History:   Diagnosis Date    GERD (gastroesophageal reflux disease)     Hypertension     Osteoarthritis of both hands     Rupture of left long head biceps tendon 05/2019    Urinary frequency        Past Surgical History:   Procedure Laterality Date    COLONOSCOPY  2006    INCISION OF UVULA      KNEE SURGERY Right        Denies any significant past family history.    Social History     Tobacco Use    Smoking status: Never    Smokeless tobacco: Never   Substance Use Topics    Alcohol use: Yes     Alcohol/week: 5.0 standard drinks     Types: 3 Glasses of wine, 2 Shots of liquor per week    Drug use: No       Allergies: NKDA.      Physical exam:  General: In no apparent distress; well developed and well nourished.  HEENT: normocephalic; atraumatic.  Cardiovascular: regular rate.  Respiratory: no increased work of breathing.  Musculoskeletal:   Inspection: Mild antalgic gait. Mild swelling at the achilles midsubstance vs the left. No significant TTP at the achilles. Pain with deep palpation in the retrocalcaneal space. Pain elicited with extremes of active  and passive plantarflexion. No pain with resisted great toe DF/PF. No palpable defect at achilles. Good tension with dhillon. Near symmetric resting ankle PF with the  patient pone and knees flexed to 90 degrees.  Alignment:  Knee: neutral               Ankle: neutral              Hindfoot: neutral              Forefoot: neutral   Strength:              Dorsiflexion 5/5  Plantar flexion 5/5  Inversion 5/5  Eversion 5/5  Sensation:              SILT distally.   Silfverskiold: Positive  ROM:              Ankle: Full; pain with extremes of ankle PF.               Subtalar: Painless inversion and eversion               Midfoot: Full and painless               Toes: Full and painless   Pulses: 2+ DP/PT pulses.                   Imaging Studies/Outside documentation:  I have ordered/reviewed/interpreted the following images/outside documentation:  1. WB 3-views of the right foot and ankle: no acute bony abnormality. Significant calcification involving the PT artery and its branches. No significant osteophyte formation. Ankle mortise remains congruent. No significant degenerative joint changes.         Assessment:  65yo Male with 6 week history of right posterior ankle pain consistent with partial achilles tendon tear +/- posterior ankle impingement.      Plan:   Will obtain MRI Right ankle w/o contrast for further evaluation. Patient to begin OTC po aleve twice daily with food. Denies any history of GI and renal dysfunction. May continue night splint use for symptomatic relief. RTC in 2 weeks after MRI complete for review and reevaluation.

## 2022-10-12 ENCOUNTER — HOSPITAL ENCOUNTER (OUTPATIENT)
Dept: RADIOLOGY | Facility: HOSPITAL | Age: 67
Discharge: HOME | End: 2022-10-12
Attending: ORTHOPAEDIC SURGERY
Payer: MEDICARE

## 2022-10-12 DIAGNOSIS — M25.871 ANKLE IMPINGEMENT SYNDROME, RIGHT: ICD-10-CM

## 2022-10-12 DIAGNOSIS — S86.011A PARTIAL ACHILLES TENDON TEAR, RIGHT, INITIAL ENCOUNTER: ICD-10-CM

## 2022-10-12 PROCEDURE — 73721 MRI ANKLE WITHOUT CONTRAST RIGHT: ICD-10-PCS | Mod: 26,RT,, | Performed by: RADIOLOGY

## 2022-10-12 PROCEDURE — 73721 MRI JNT OF LWR EXTRE W/O DYE: CPT | Mod: TC,PO,RT

## 2022-10-12 PROCEDURE — 73721 MRI JNT OF LWR EXTRE W/O DYE: CPT | Mod: 26,RT,, | Performed by: RADIOLOGY

## 2022-10-13 ENCOUNTER — OFFICE VISIT (OUTPATIENT)
Dept: ORTHOPEDICS | Facility: CLINIC | Age: 67
End: 2022-10-13
Payer: MEDICARE

## 2022-10-13 VITALS — WEIGHT: 202 LBS | HEIGHT: 72 IN | BODY MASS INDEX: 27.36 KG/M2

## 2022-10-13 DIAGNOSIS — M76.60 NON-INSERTIONAL ACHILLES TENDINOPATHY: Primary | ICD-10-CM

## 2022-10-13 DIAGNOSIS — M77.51 RETROCALCANEAL BURSITIS (BACK OF HEEL), RIGHT: ICD-10-CM

## 2022-10-13 DIAGNOSIS — M77.51 TENDINITIS OF RIGHT FLEXOR HALLUCIS LONGUS: ICD-10-CM

## 2022-10-13 PROBLEM — S86.011A: Status: RESOLVED | Noted: 2022-09-30 | Resolved: 2022-10-13

## 2022-10-13 PROCEDURE — 99999 PR PBB SHADOW E&M-EST. PATIENT-LVL III: CPT | Mod: PBBFAC,,, | Performed by: ORTHOPAEDIC SURGERY

## 2022-10-13 PROCEDURE — 1126F PR PAIN SEVERITY QUANTIFIED, NO PAIN PRESENT: ICD-10-PCS | Mod: CPTII,S$GLB,, | Performed by: ORTHOPAEDIC SURGERY

## 2022-10-13 PROCEDURE — 1101F PT FALLS ASSESS-DOCD LE1/YR: CPT | Mod: CPTII,S$GLB,, | Performed by: ORTHOPAEDIC SURGERY

## 2022-10-13 PROCEDURE — 1101F PR PT FALLS ASSESS DOC 0-1 FALLS W/OUT INJ PAST YR: ICD-10-PCS | Mod: CPTII,S$GLB,, | Performed by: ORTHOPAEDIC SURGERY

## 2022-10-13 PROCEDURE — 99213 PR OFFICE/OUTPT VISIT, EST, LEVL III, 20-29 MIN: ICD-10-PCS | Mod: S$GLB,,, | Performed by: ORTHOPAEDIC SURGERY

## 2022-10-13 PROCEDURE — 1159F PR MEDICATION LIST DOCUMENTED IN MEDICAL RECORD: ICD-10-PCS | Mod: CPTII,S$GLB,, | Performed by: ORTHOPAEDIC SURGERY

## 2022-10-13 PROCEDURE — 1126F AMNT PAIN NOTED NONE PRSNT: CPT | Mod: CPTII,S$GLB,, | Performed by: ORTHOPAEDIC SURGERY

## 2022-10-13 PROCEDURE — 1160F PR REVIEW ALL MEDS BY PRESCRIBER/CLIN PHARMACIST DOCUMENTED: ICD-10-PCS | Mod: CPTII,S$GLB,, | Performed by: ORTHOPAEDIC SURGERY

## 2022-10-13 PROCEDURE — 99213 OFFICE O/P EST LOW 20 MIN: CPT | Mod: S$GLB,,, | Performed by: ORTHOPAEDIC SURGERY

## 2022-10-13 PROCEDURE — 1160F RVW MEDS BY RX/DR IN RCRD: CPT | Mod: CPTII,S$GLB,, | Performed by: ORTHOPAEDIC SURGERY

## 2022-10-13 PROCEDURE — 99999 PR PBB SHADOW E&M-EST. PATIENT-LVL III: ICD-10-PCS | Mod: PBBFAC,,, | Performed by: ORTHOPAEDIC SURGERY

## 2022-10-13 PROCEDURE — 1159F MED LIST DOCD IN RCRD: CPT | Mod: CPTII,S$GLB,, | Performed by: ORTHOPAEDIC SURGERY

## 2022-10-13 PROCEDURE — 3288F FALL RISK ASSESSMENT DOCD: CPT | Mod: CPTII,S$GLB,, | Performed by: ORTHOPAEDIC SURGERY

## 2022-10-13 PROCEDURE — 3288F PR FALLS RISK ASSESSMENT DOCUMENTED: ICD-10-PCS | Mod: CPTII,S$GLB,, | Performed by: ORTHOPAEDIC SURGERY

## 2022-10-13 RX ORDER — METHYLPREDNISOLONE 4 MG/1
TABLET ORAL
Qty: 21 EACH | Refills: 0 | Status: SHIPPED | OUTPATIENT
Start: 2022-10-13 | End: 2022-10-24

## 2022-10-13 NOTE — PROGRESS NOTES
Status/Diagnosis: Right retrocalc bursitis; FHL tenosynovitis; midsubstance achilles tendinopathy.  Date of Surgery: N/A  Date of Injury: N/A; Symptoms onset Mid-September 2022.  Return visit:  P.r.n.  X-rays on Return: None.    Chief Complaint:   Chief Complaint   Patient presents with    Right Ankle - Pain     Present History:  Malcom Ho is a 66 y.o. male who presents today for MRI results.  Patient reports compliance with over-the-counter Aleve.  Still using his night splint.  No other history of immobilization/good wear.  Symptoms initially unchanged.  Still pain with extremes of plantar flexion.  No new injuries.  Unable to walk regularly for exercise due to previously noted symptoms.      Past Medical History:   Diagnosis Date    GERD (gastroesophageal reflux disease)     Hypertension     Osteoarthritis of both hands     Rupture of left long head biceps tendon 05/2019    Urinary frequency        Past Surgical History:   Procedure Laterality Date    COLONOSCOPY  2006    INCISION OF UVULA      KNEE SURGERY Right        Current Outpatient Medications   Medication Sig    aspirin (ECOTRIN) 81 MG EC tablet Take 81 mg by mouth once daily.    atorvastatin (LIPITOR) 20 MG tablet Take 1 tablet (20 mg total) by mouth once daily.    carvediloL (COREG) 6.25 MG tablet Take 6.25 mg by mouth 2 (two) times daily with meals.    EPINEPHrine (EPIPEN) 0.3 mg/0.3 mL AtIn     fluticasone propionate (FLONASE) 50 mcg/actuation nasal spray 1 spray (50 mcg total) by Each Nostril route 2 (two) times a day.    losartan-hydrochlorothiazide 100-25 mg (HYZAAR) 100-25 mg per tablet TAKE 1 TABLET BY MOUTH EVERY DAY    pantoprazole (PROTONIX) 40 MG tablet Take 40 mg by mouth once daily.    benzonatate (TESSALON PERLES) 100 MG capsule Take 1 capsule (100 mg total) by mouth 3 (three) times daily as needed for Cough. (Patient not taking: No sig reported)    methylPREDNISolone (MEDROL DOSEPACK) 4 mg tablet use as directed     No current  facility-administered medications for this visit.       Review of patient's allergies indicates:  No Known Allergies    Family History   Problem Relation Age of Onset    No Known Problems Mother     No Known Problems Father        Social History     Socioeconomic History    Marital status:    Tobacco Use    Smoking status: Never    Smokeless tobacco: Never   Substance and Sexual Activity    Alcohol use: Yes     Alcohol/week: 5.0 standard drinks     Types: 3 Glasses of wine, 2 Shots of liquor per week    Drug use: No     Social Determinants of Health     Financial Resource Strain: Low Risk     Difficulty of Paying Living Expenses: Not hard at all   Food Insecurity: No Food Insecurity    Worried About Running Out of Food in the Last Year: Never true    Ran Out of Food in the Last Year: Never true   Transportation Needs: No Transportation Needs    Lack of Transportation (Medical): No    Lack of Transportation (Non-Medical): No   Physical Activity: Sufficiently Active    Days of Exercise per Week: 7 days    Minutes of Exercise per Session: 60 min   Stress: No Stress Concern Present    Feeling of Stress : Not at all   Social Connections: Unknown    Frequency of Communication with Friends and Family: Patient refused    Frequency of Social Gatherings with Friends and Family: More than three times a week    Active Member of Clubs or Organizations: No    Attends Club or Organization Meetings: Never    Marital Status:    Housing Stability: High Risk    Unable to Pay for Housing in the Last Year: No    Number of Places Lived in the Last Year: 1    Unstable Housing in the Last Year: Yes       Physical exam:  There were no vitals filed for this visit.  Body mass index is 27.4 kg/m².  General: In no apparent distress; well developed and well nourished.  HEENT: normocephalic; atraumatic.  Cardiovascular: regular rate.  Respiratory: no increased work of breathing.  Musculoskeletal:   Gait:  Mild antalgic  Inspection:  exam with less swelling today about the posterior ankle, otherwise unchanged. No significant TTP at the achilles. Pain with deep palpation in the retrocalcaneal space (more lateral than medial). Pain elicited with extremes of active and passive plantarflexion. No pain with resisted great toe DF/PF. No palpable defect at achilles. Good tension with dhillon. Near symmetric resting ankle PF with the  patient pone and knees flexed to 90 degrees.  Alignment:  Knee: neutral               Ankle: neutral              Hindfoot: neutral              Forefoot: neutral   Strength:              Dorsiflexion 5/5  Plantar flexion 5/5  Inversion 5/5  Eversion 5/5  Sensation:              SILT distally.   Silfverskiold: positive  ROM:              Ankle:Full; pain with extremes of ankle PF.              Subtalar: Painless inversion and eversion               Midfoot: Full and painless               Toes: Full and painless   Pulses: 2+ DP/PT pulses.                   Imaging Studies/Outside documentation:  I have ordered/reviewed/interpreted the following images/outside documentation:  1. MRI right ankle w/o contrast:  Thickening of the Achilles midsubstance with no significant increased signal change on T2 imaging to suggest tear.  Moderate to severe tenosynovitis of the FHL tendon.  As noted on the report, nonspecific subcutaneous edema the about the posterior ankle/hindfoot as well as some degree of edema within the retrocalcaneal space.  No clear etiology for this diffuse edema.        Assessment:  Malcom Ho is a 66 y.o. male with Right retrocalcaneal bursitis; FHL tenosynovitis; midsubstance achilles tendinopathy.     Plan:   Clinical and radiographic findings were discussed.  Recommend conservative management to include a short course of oral steroids, Medrol Dosepak prescription provided.  We will also initiate boot immobilization for the next 2-3 weeks.  Patient to wear at all times except for sleep, hygiene, home  ankle range of motion exercises.  Discussed the natural history of retrocalcaneal bursitis and FHL tenosynovitis.  Usually subsides with rest and activity modification, anti-inflammatories, etc..  We did discuss the potential for a corticosteroid injection within the retrocalcaneal space.  Deferred at this time.  Patient will wean out of the boot in the above-noted timeline and will call regarding future follow-up should symptoms persist or worsen.    This note was created using voice recognition software and may contain grammatical errors.

## 2022-10-24 ENCOUNTER — OFFICE VISIT (OUTPATIENT)
Dept: URGENT CARE | Facility: CLINIC | Age: 67
End: 2022-10-24
Payer: MEDICARE

## 2022-10-24 VITALS
OXYGEN SATURATION: 96 % | HEIGHT: 72 IN | RESPIRATION RATE: 16 BRPM | HEART RATE: 62 BPM | DIASTOLIC BLOOD PRESSURE: 76 MMHG | BODY MASS INDEX: 27.5 KG/M2 | WEIGHT: 203 LBS | TEMPERATURE: 98 F | SYSTOLIC BLOOD PRESSURE: 135 MMHG

## 2022-10-24 DIAGNOSIS — R06.02 SOB (SHORTNESS OF BREATH): Primary | ICD-10-CM

## 2022-10-24 LAB
CTP QC/QA: YES
SARS-COV-2 RDRP RESP QL NAA+PROBE: NEGATIVE

## 2022-10-24 PROCEDURE — 1126F PR PAIN SEVERITY QUANTIFIED, NO PAIN PRESENT: ICD-10-PCS | Mod: CPTII,S$GLB,, | Performed by: NURSE PRACTITIONER

## 2022-10-24 PROCEDURE — 3075F PR MOST RECENT SYSTOLIC BLOOD PRESS GE 130-139MM HG: ICD-10-PCS | Mod: CPTII,S$GLB,, | Performed by: NURSE PRACTITIONER

## 2022-10-24 PROCEDURE — 99213 OFFICE O/P EST LOW 20 MIN: CPT | Mod: S$GLB,,, | Performed by: NURSE PRACTITIONER

## 2022-10-24 PROCEDURE — 1126F AMNT PAIN NOTED NONE PRSNT: CPT | Mod: CPTII,S$GLB,, | Performed by: NURSE PRACTITIONER

## 2022-10-24 PROCEDURE — 87635: ICD-10-PCS | Mod: QW,S$GLB,, | Performed by: NURSE PRACTITIONER

## 2022-10-24 PROCEDURE — 1160F RVW MEDS BY RX/DR IN RCRD: CPT | Mod: CPTII,S$GLB,, | Performed by: NURSE PRACTITIONER

## 2022-10-24 PROCEDURE — 71046 XR CHEST PA AND LATERAL: ICD-10-PCS | Mod: S$GLB,,, | Performed by: RADIOLOGY

## 2022-10-24 PROCEDURE — 1160F PR REVIEW ALL MEDS BY PRESCRIBER/CLIN PHARMACIST DOCUMENTED: ICD-10-PCS | Mod: CPTII,S$GLB,, | Performed by: NURSE PRACTITIONER

## 2022-10-24 PROCEDURE — 1159F PR MEDICATION LIST DOCUMENTED IN MEDICAL RECORD: ICD-10-PCS | Mod: CPTII,S$GLB,, | Performed by: NURSE PRACTITIONER

## 2022-10-24 PROCEDURE — 3078F DIAST BP <80 MM HG: CPT | Mod: CPTII,S$GLB,, | Performed by: NURSE PRACTITIONER

## 2022-10-24 PROCEDURE — 87635 SARS-COV-2 COVID-19 AMP PRB: CPT | Mod: QW,S$GLB,, | Performed by: NURSE PRACTITIONER

## 2022-10-24 PROCEDURE — 71046 X-RAY EXAM CHEST 2 VIEWS: CPT | Mod: S$GLB,,, | Performed by: RADIOLOGY

## 2022-10-24 PROCEDURE — 1159F MED LIST DOCD IN RCRD: CPT | Mod: CPTII,S$GLB,, | Performed by: NURSE PRACTITIONER

## 2022-10-24 PROCEDURE — 3075F SYST BP GE 130 - 139MM HG: CPT | Mod: CPTII,S$GLB,, | Performed by: NURSE PRACTITIONER

## 2022-10-24 PROCEDURE — 3078F PR MOST RECENT DIASTOLIC BLOOD PRESSURE < 80 MM HG: ICD-10-PCS | Mod: CPTII,S$GLB,, | Performed by: NURSE PRACTITIONER

## 2022-10-24 PROCEDURE — 99213 PR OFFICE/OUTPT VISIT, EST, LEVL III, 20-29 MIN: ICD-10-PCS | Mod: S$GLB,,, | Performed by: NURSE PRACTITIONER

## 2022-10-24 RX ORDER — ALBUTEROL SULFATE 90 UG/1
2 AEROSOL, METERED RESPIRATORY (INHALATION) EVERY 6 HOURS PRN
Qty: 6.7 G | Refills: 0 | Status: SHIPPED | OUTPATIENT
Start: 2022-10-24 | End: 2022-10-31

## 2022-10-24 RX ORDER — LATANOPROST 50 UG/ML
1 SOLUTION/ DROPS OPHTHALMIC NIGHTLY
COMMUNITY
Start: 2022-08-25

## 2022-10-24 NOTE — PROGRESS NOTES
Subjective:       Patient ID: Malcom Ho is a 66 y.o. male.    Vitals:  height is 6' (1.829 m) and weight is 92.1 kg (203 lb). His oral temperature is 98.3 °F (36.8 °C). His blood pressure is 135/76 and his pulse is 62. His respiration is 16 and oxygen saturation is 96%.     Chief Complaint: Shortness of Breath    Pt presents today with shortness of breathing, with inability to take a deep breath. X's 2 days. PT has not taken anything for relief. Pt believes it is from a change in medication (betablocker) carvediloL.    Provider note begins below:  Patient was seen at [facility] 1 month ago for CP and had a negative cardiac workup. Patient reports they started him on Coreg at that time when he was discharged. He has had SOB for the past few days and describes a feeling of a bag being over his face.  Patient denies any chest pain, cough, hemoptysis, fever, chills, diaphoresis, nausea, syncopal feelings, palpitations or leg swelling. Patient is awake and alert.  Speaking in full sentences. Afebrile.  Scheduled for this Thursday to see Dr. Ornelas for a wellness checkup.    Shortness of Breath  This is a new problem. Episode onset: 2 days. The problem occurs constantly. The problem has been unchanged. Pertinent negatives include no abdominal pain, chest pain, claudication, coryza, ear pain, fever, headaches, hemoptysis, leg pain, leg swelling, neck pain, orthopnea, PND, rash, rhinorrhea, sore throat, sputum production, swollen glands, syncope, vomiting or wheezing. Nothing aggravates the symptoms. The patient has no known risk factors for DVT/PE. He has tried nothing for the symptoms. The treatment provided no relief.     Constitution: Negative. Negative for chills, fatigue and fever.   HENT:  Negative for ear pain, ear discharge, facial swelling, sinus pressure and sore throat.    Neck: Negative for neck pain, neck stiffness and painful lymph nodes.   Cardiovascular: Negative.  Negative for chest pain, leg  swelling, sob on exertion and passing out.   Eyes: Negative.  Negative for eye itching, eye pain and eye redness.   Respiratory:  Positive for shortness of breath. Negative for chest tightness, cough, sputum production, bloody sputum, wheezing and asthma.    Gastrointestinal: Negative.  Negative for abdominal pain, nausea and vomiting.   Endocrine: negative. excessive thirst.   Genitourinary: Negative.  Negative for dysuria, frequency, urgency and flank pain.   Musculoskeletal: Negative.  Negative for pain, trauma, joint pain and joint swelling.   Skin: Negative.  Negative for rash, wound, lesion and hives.   Allergic/Immunologic: Negative.  Negative for eczema, asthma, hives, itching and sneezing.   Neurological: Negative.  Negative for dizziness, passing out, headaches, disorientation and altered mental status.   Hematologic/Lymphatic: Negative.  Negative for swollen lymph nodes.   Psychiatric/Behavioral: Negative.  Negative for altered mental status, disorientation and confusion.      Objective:      Physical Exam   Constitutional: He is oriented to person, place, and time. He appears well-developed. He is cooperative.  Non-toxic appearance. He does not appear ill. No distress.   HENT:   Head: Normocephalic and atraumatic.   Ears:   Right Ear: Hearing, tympanic membrane, external ear and ear canal normal. impacted cerumen  Left Ear: Hearing, tympanic membrane, external ear and ear canal normal. impacted cerumen  Nose: Nose normal. No mucosal edema, rhinorrhea, nasal deformity or congestion. No epistaxis. Right sinus exhibits no maxillary sinus tenderness and no frontal sinus tenderness. Left sinus exhibits no maxillary sinus tenderness and no frontal sinus tenderness.   Mouth/Throat: Uvula is midline, oropharynx is clear and moist and mucous membranes are normal. No trismus in the jaw. Normal dentition. No uvula swelling. No oropharyngeal exudate, posterior oropharyngeal edema or posterior oropharyngeal erythema.    Eyes: Conjunctivae and lids are normal. No scleral icterus.   Neck: Trachea normal and phonation normal. Neck supple. No edema present. No erythema present. No neck rigidity present.   Cardiovascular: Normal rate, regular rhythm, normal heart sounds and normal pulses.   Pulmonary/Chest: Effort normal and breath sounds normal. No stridor. No respiratory distress. He has no decreased breath sounds. He has no wheezes. He has no rhonchi. He has no rales. He exhibits no tenderness.   Abdominal: Normal appearance. Soft. flat abdomen There is no abdominal tenderness. There is no left CVA tenderness and no right CVA tenderness.   Musculoskeletal: Normal range of motion.         General: No deformity. Normal range of motion.      Right lower leg: No edema.      Left lower leg: No edema.   Lymphadenopathy:     He has no cervical adenopathy.   Neurological: no focal deficit. He is alert and oriented to person, place, and time. He exhibits normal muscle tone. Coordination normal.   Skin: Skin is warm, dry, intact, not diaphoretic and not pale.   Psychiatric: His speech is normal and behavior is normal. Mood, judgment and thought content normal.   Nursing note and vitals reviewed.  The following results have been reviewed with the patient:  LABS-  Results for orders placed or performed in visit on 10/24/22   POCT COVID-19 Rapid Screening   Result Value Ref Range    POC Rapid COVID Negative Negative     Acceptable Yes         IMAGING-  XR CHEST PA AND LATERAL    Result Date: 10/24/2022  EXAMINATION: XR CHEST PA AND LATERAL CLINICAL HISTORY: Shortness of breath TECHNIQUE: PA and lateral views of the chest were performed. COMPARISON: None FINDINGS: Lungs are clear.Normal cardiomediastinal silhouette.Normal pulmonary vascular distribution.No pleural effusion or pneumothorax.Spinal degenerative change. Electronically signed by: Alan Sheffield Date:    10/24/2022 Time:    16:15          Assessment:       1. SOB  (shortness of breath)          Plan:       All hx was provided by the pt or available as part of established EMR. The pt past medical hx, family hx, social hx, and current medications were reviewed. Interpretation of diagnostics performed today were discussed. Tx discussed. Pt may follow up with [facility] for any concern. ER precautions given if his sob worsens, fails to improve or he develops other worsening symptoms. Pt voiced understanding of all discussed, and agreed.   FOLLOWUP  Follow up if symptoms worsen or fail to improve, for PLEASE CONTACT PCP OR CONTACT THE EMERGENCY ROOM..     PATIENT INSTRUCTIONS  Patient Instructions   INSTRUCTIONS:  - Rest.  - Drink plenty of fluids.  - Take Tylenol and/or Ibuprofen as directed as needed for fever/pain.  Do not take more than the recommended dose.  - follow up with your PCP within the next 1-2 weeks as needed.  - You must understand that you have received an Urgent Care treatment only and that you may be released before all of your medical problems are known or treated.   - You, the patient, will arrange for follow up care as instructed.   - If your condition worsens or fails to improve we recommend that you receive another evaluation at the ER immediately or contact your PCP to discuss your concerns.   - You can call [phone] or [phone] to help schedule an appointment with the appropriate provider.     -If you smoke cigarettes, it would be beneficial for you to stop.       THANK YOU FOR ALLOWING ME TO PARTICIPATE IN YOUR HEALTHCARE,     Malcom Miranda NP     SOB (shortness of breath)  -     XR CHEST PA AND LATERAL; Future; Expected date: 10/24/2022  -     POCT COVID-19 Rapid Screening  -     albuterol (VENTOLIN HFA) 90 mcg/actuation inhaler; Inhale 2 puffs into the lungs every 6 (six) hours as needed for Wheezing or Shortness of Breath. Rescue  Dispense: 6.7 g; Refill: 0            Additional MDM:     Well's Criteria Score:  -Clinical symptoms of DVT (leg  swelling, pain with palpation) = 0.0  -Other diagnosis less likely than pulmonary embolism =            0.0  -Heart Rate >100 =   0.0  -Immobilization (= or > than 3 days) or surgery in the previous 4 weeks = 0.0  -Previous DVT/PE = 0.0  -Hemoptysis =          0.0  -Malignancy =           0.0  Well's Probability Score =    0

## 2022-10-24 NOTE — PATIENT INSTRUCTIONS

## 2022-11-02 ENCOUNTER — TELEPHONE (OUTPATIENT)
Dept: ENDOSCOPY | Facility: HOSPITAL | Age: 67
End: 2022-11-02
Payer: MEDICARE

## 2022-11-02 NOTE — TELEPHONE ENCOUNTER
Good afternoon,    Please contact patient regarding new prep instructions for procedure on Monday, 11/7. Thank you!

## 2022-11-03 NOTE — TELEPHONE ENCOUNTER
Called and spoke with the patient, patient notified that prep instructions will be sent to his patient portal for his reference, patient verbalized understanding of this.

## 2022-11-06 NOTE — H&P
History & Physical - Short Stay  Gastroenterology      SUBJECTIVE:     Procedure: Colonoscopy    Chief Complaint/Indication for Procedure: Screening    History of Present Illness:  Asymptomatic    See recent communiques:  Luda Sidhu MA    7/12/2022   10:54 AM  Note  ----- Message from Danette Cheung LPN sent at 7/12/2022 10:46 AM CDT -----  Regarding: Colonscopy Referral  I contacted Mr. Ho regarding colon cancer screening today, he would like to have referral to GI to have a colonoscopy done at this time. He says his last colonoscopy was over 10 years ago. Thanks!           And see recent PCP OV note:  Office Visit  2/16/2022  [facility]  Whitley Ornelas DO  Internal Medicine Gastroesophageal reflux disease, unspecified whether esophagitis present +3 more  Dx Gastroesophageal Reflux   Hand Pain   Urinary Urgency   Reason for Visit     Progress Notes    Whitley Ornelas DO at 2/16/2022  2:30 PM    Status: Signed   Expand All Collapse All   Patient ID: Malcom Ho is a 66 y.o. male.     Chief Complaint: Gastroesophageal Reflux (Tried pantoprazole in the past without relief; current treatment includes Tums, has also tried Prilosec OTC x 7 days with  good results), Hand Pain (Right thumb and ring finger; characterized as intermittent joint pain and stiffness), and Urinary Urgency (Recurrent episodes of urinary urgency lasting up to 3 days and returning to normal)           HPI  Malcom Ho is a 66 y.o. male  presents to clinic for multiple complaints.     GERD- pt had been given pantoprazole in the past, but had not been taking it as he did not realize it was for his reflux.  He noted heartburn on Monday.  He is not following a specific reflux diet.  He tried tums and omeprazole with good relief of his symptoms.     Hand pain- specifically right thumb and ring finger pain with intermittent joint pain and stiffness.  Previous Xray of b/l hands 4/2021 indicated degenerative  changes in both hands.     History of left biceps tendon tear.  Patient saw Dr. Park back in 2019. At that time he recommended physical therapy, patient did not complete Pt.  He is now having increased weakness in the left arm, definite bruits a noted by that.  He would like evaluation with Dr. Park, he requests referral.     Urinary frequency.  Last PSA was normal (> 1 year), he had this 5 days ago.  He has increased urinary frequency.  He denies pain on urination or blood in the urine.  He states that he will go through phases of this where he has increased frequency for a few days and then it resolved.  He is amenable to seeing Urology.    Assessment:  1. Gastroesophageal reflux disease, unspecified whether esophagitis present    2. Bicipital tendinitis of left shoulder    3. Urinary frequency    4. Arthritis of both hands       Plan:              Orders Placed This Encounter   Procedures    Urine culture       Standing Status:   Future       Standing Expiration Date:   4/17/2023    Urinalysis       Standing Status:   Future       Standing Expiration Date:   4/17/2023    Ambulatory referral/consult to Orthopedics       Standing Status:   Future       Standing Expiration Date:   3/16/2023       Referral Priority:   Routine       Referral Type:   Consultation       Referred to Provider:   Brian Park II, MD       Requested Specialty:   Orthopedic Surgery       Number of Visits Requested:   1    Ambulatory referral/consult to Urology       Standing Status:   Future       Standing Expiration Date:   3/16/2023       Referral Priority:   Routine       Referral Type:   Consultation       Referral Reason:   Specialty Services Required       Referred to Provider:   Giovanni Meraz MD       Requested Specialty:   Urology       Number of Visits Requested:   1    Ambulatory referral/consult to Physical/Occupational Therapy       Standing Status:   Future       Standing Expiration Date:   3/16/2023        Referral Priority:   Routine       Referral Type:   Physical Medicine       Referral Reason:   Specialty Services Required       Referred to Provider:   [facility]       Requested Specialty:   Physical Therapy       Number of Visits Requested:   1         Gastroesophageal reflux disease, unspecified whether esophagitis present  Follow reflux diet  Start daily omeprazole  -     omeprazole (PRILOSEC OTC) 20 MG tablet; Take 1 tablet (20 mg total) by mouth once daily.  Dispense: 30 tablet; Refill: 3     Bicipital tendinitis of left shoulder  Pt notes increased weakness now in L arm, also some bruising in L upper arm over bicep  Wants to be re-referred back to Dr. Mckeon  -     Ambulatory referral/consult to Orthopedics; Future; Expected date: 02/23/2022  -     Ambulatory referral/consult to Physical/Occupational Therapy; Future; Expected date: 02/23/2022     Urinary frequency  -     Ambulatory referral/consult to Urology; Future; Expected date: 02/23/2022  -     Urinalysis; Future; Expected date: 02/16/2022  -     Urine culture; Future; Expected date: 02/16/2022     Arthritis of both hands  Discussed avoiding NSAID's d/t GERD  Can use tylenol if needed  Discussed paraffin wax baths  Referred to PT  -     Ambulatory referral/consult to Physical/Occupational Therapy; Future; Expected date: 02/23/2022           Pt amenable to above plan  F/U in3  Months for wellness visit or sooner if needed              PTA Medications   Medication Sig    aspirin (ECOTRIN) 81 MG EC tablet Take 81 mg by mouth once daily.    atorvastatin (LIPITOR) 20 MG tablet Take 1 tablet (20 mg total) by mouth once daily.    carvediloL (COREG) 6.25 MG tablet Take 3.125 mg by mouth 2 (two) times daily with meals.    fluticasone propionate (FLONASE) 50 mcg/actuation nasal spray 1 spray (50 mcg total) by Each Nostril route 2 (two) times a day.    latanoprost 0.005 % ophthalmic solution Place 1 drop into both eyes every evening.     losartan-hydrochlorothiazide 100-25 mg (HYZAAR) 100-25 mg per tablet TAKE 1 TABLET BY MOUTH EVERY DAY    pantoprazole (PROTONIX) 40 MG tablet Take 1 tablet (40 mg total) by mouth once daily.    EPINEPHrine (EPIPEN) 0.3 mg/0.3 mL AtIn        Review of patient's allergies indicates:  No Known Allergies     Past Medical History:   Diagnosis Date    GERD (gastroesophageal reflux disease)     Hypertension     Osteoarthritis of both hands     Rupture of left long head biceps tendon 05/2019    Urinary frequency      Past Surgical History:   Procedure Laterality Date    COLONOSCOPY  2006    INCISION OF UVULA      KNEE SURGERY Right      Family History   Problem Relation Age of Onset    No Known Problems Mother     No Known Problems Father      Social History     Tobacco Use    Smoking status: Never     Passive exposure: Never    Smokeless tobacco: Never   Substance Use Topics    Alcohol use: Yes     Alcohol/week: 5.0 standard drinks     Types: 3 Glasses of wine, 2 Shots of liquor per week    Drug use: No         OBJECTIVE:     Vital Signs (Most Recent)  Temp: 98.2 °F (36.8 °C) (11/07/22 1116)  Pulse: 81 (11/07/22 1116)  Resp: 16 (11/07/22 1116)  BP: (!) 171/84 (11/07/22 1116)  SpO2: 98 % (11/07/22 1116)    Physical Exam:  :  Ht: 6' (182.9 cm)   Wt: 91.6 kg (202 lb)   BMI: 27.40 kg/m²                                                      GENERAL:  Comfortable, in no acute distress.                                 HEENT EXAM:  Nonicteric.  No adenopathy.  Oropharynx is clear.               NECK:  Supple.                                                               LUNGS:  Clear.                                                               CARDIAC:  Regular rate and rhythm.  S1, S2.  No murmur.                      ABDOMEN:  Soft, positive bowel sounds, nontender.  No hepatosplenomegaly or masses.  No rebound or guarding.                                             EXTREMITIES:  No edema.     MENTAL STATUS:  Alert and  oriented.    ASSESSMENT/PLAN:     Assessment: Colorectal cancer screening    Plan: Colonoscopy    Anesthesia Plan:   MAC / General Anaesthesia    ASA Grade: ASA 2 - Patient with mild systemic disease with no functional limitations    MALLAMPATI SCORE: I (soft palate, uvula, fauces, and tonsillar pillars visible)

## 2022-11-07 ENCOUNTER — ANESTHESIA EVENT (OUTPATIENT)
Dept: ENDOSCOPY | Facility: HOSPITAL | Age: 67
End: 2022-11-07
Payer: MEDICARE

## 2022-11-07 ENCOUNTER — ANESTHESIA (OUTPATIENT)
Dept: ENDOSCOPY | Facility: HOSPITAL | Age: 67
End: 2022-11-07
Payer: MEDICARE

## 2022-11-07 ENCOUNTER — HOSPITAL ENCOUNTER (OUTPATIENT)
Facility: HOSPITAL | Age: 67
Discharge: HOME | End: 2022-11-07
Attending: INTERNAL MEDICINE | Admitting: INTERNAL MEDICINE
Payer: MEDICARE

## 2022-11-07 VITALS
OXYGEN SATURATION: 96 % | HEIGHT: 72 IN | WEIGHT: 202 LBS | TEMPERATURE: 98 F | DIASTOLIC BLOOD PRESSURE: 68 MMHG | HEART RATE: 62 BPM | BODY MASS INDEX: 27.36 KG/M2 | RESPIRATION RATE: 16 BRPM | SYSTOLIC BLOOD PRESSURE: 110 MMHG

## 2022-11-07 DIAGNOSIS — Z12.11 COLON CANCER SCREENING: ICD-10-CM

## 2022-11-07 PROCEDURE — 25000003 PHARM REV CODE 250: Mod: PO | Performed by: NURSE ANESTHETIST, CERTIFIED REGISTERED

## 2022-11-07 PROCEDURE — 37000008 HC ANESTHESIA 1ST 15 MINUTES: Mod: PO | Performed by: INTERNAL MEDICINE

## 2022-11-07 PROCEDURE — G0121 COLON CA SCRN NOT HI RSK IND: HCPCS | Mod: ,,, | Performed by: INTERNAL MEDICINE

## 2022-11-07 PROCEDURE — 37000009 HC ANESTHESIA EA ADD 15 MINS: Mod: PO | Performed by: INTERNAL MEDICINE

## 2022-11-07 PROCEDURE — 63600175 PHARM REV CODE 636 W HCPCS: Mod: PO | Performed by: NURSE ANESTHETIST, CERTIFIED REGISTERED

## 2022-11-07 PROCEDURE — D9220A PRA ANESTHESIA: Mod: CRNA,,, | Performed by: NURSE ANESTHETIST, CERTIFIED REGISTERED

## 2022-11-07 PROCEDURE — G0121 COLON CA SCRN NOT HI RSK IND: ICD-10-PCS | Mod: ,,, | Performed by: INTERNAL MEDICINE

## 2022-11-07 PROCEDURE — G0121 COLON CA SCRN NOT HI RSK IND: HCPCS | Mod: PO | Performed by: INTERNAL MEDICINE

## 2022-11-07 PROCEDURE — D9220A PRA ANESTHESIA: ICD-10-PCS | Mod: CRNA,,, | Performed by: NURSE ANESTHETIST, CERTIFIED REGISTERED

## 2022-11-07 PROCEDURE — 63600175 PHARM REV CODE 636 W HCPCS: Mod: PO | Performed by: INTERNAL MEDICINE

## 2022-11-07 PROCEDURE — D9220A PRA ANESTHESIA: Mod: ANES,,, | Performed by: ANESTHESIOLOGY

## 2022-11-07 PROCEDURE — D9220A PRA ANESTHESIA: ICD-10-PCS | Mod: ANES,,, | Performed by: ANESTHESIOLOGY

## 2022-11-07 RX ORDER — PROPOFOL 10 MG/ML
VIAL (ML) INTRAVENOUS CONTINUOUS PRN
Status: DISCONTINUED | OUTPATIENT
Start: 2022-11-07 | End: 2022-11-08

## 2022-11-07 RX ORDER — SODIUM CHLORIDE, SODIUM LACTATE, POTASSIUM CHLORIDE, CALCIUM CHLORIDE 600; 310; 30; 20 MG/100ML; MG/100ML; MG/100ML; MG/100ML
INJECTION, SOLUTION INTRAVENOUS CONTINUOUS
Status: DISCONTINUED | OUTPATIENT
Start: 2022-11-07 | End: 2022-11-07 | Stop reason: HOSPADM

## 2022-11-07 RX ORDER — PROPOFOL 10 MG/ML
VIAL (ML) INTRAVENOUS
Status: DISCONTINUED | OUTPATIENT
Start: 2022-11-07 | End: 2022-11-08

## 2022-11-07 RX ORDER — LIDOCAINE HCL/PF 100 MG/5ML
SYRINGE (ML) INTRAVENOUS
Status: DISCONTINUED | OUTPATIENT
Start: 2022-11-07 | End: 2022-11-08

## 2022-11-07 RX ORDER — SODIUM CHLORIDE 0.9 % (FLUSH) 0.9 %
10 SYRINGE (ML) INJECTION
Status: DISCONTINUED | OUTPATIENT
Start: 2022-11-07 | End: 2022-11-07 | Stop reason: HOSPADM

## 2022-11-07 RX ADMIN — SODIUM CHLORIDE, SODIUM LACTATE, POTASSIUM CHLORIDE, AND CALCIUM CHLORIDE: .6; .31; .03; .02 INJECTION, SOLUTION INTRAVENOUS at 11:11

## 2022-11-07 RX ADMIN — PROPOFOL 130 MG: 10 INJECTION, EMULSION INTRAVENOUS at 12:11

## 2022-11-07 RX ADMIN — LIDOCAINE HYDROCHLORIDE 100 MG: 20 INJECTION, SOLUTION INTRAVENOUS at 12:11

## 2022-11-07 RX ADMIN — PROPOFOL 100 MCG/KG/MIN: 10 INJECTION, EMULSION INTRAVENOUS at 12:11

## 2022-11-07 NOTE — PROVATION PATIENT INSTRUCTIONS
Discharge Summary/Instructions for after Colonoscopy without   Biopsy/Polypectomy  Malcom Ho    Monday, November 7, 2022  Cameron Bradshaw MD  RESTRICTIONS ON ACTIVITY:  - Do not drive a car or operate machinery until the day after the procedure.      - The following day: return to full activity including work.  - For  3 days: No heavy lifting, straining or running.  - Diet: You may eat solid foods, but no gassy foods (i.e. beans, broccoli,   cabbage, etc).  TREATMENT FOR COMMON SIDE EFFECTS:  - Mild abdominal pain and bloating or excessive gas: rest, eat lightly and   use a heating pad.  SYMPTOMS TO WATCH FOR AND REPORT TO YOUR PHYSICIAN:  1. Severe abdominal pain.  2. Fever within 24 hours after a procedure.  3. A large amount of rectal bleeding. (A small amount of blood from the   rectum is not serious, especially if hemorrhoids are present.  3.  Because air was put into your colon during the procedure, expelling   large amounts of air from your rectum is normal.  4.  You may not have a bowel movement for 1-3 days because of the   colonoscopy prep.  This is normal.  5.  Call immediately if you notice any of the following:   Chills and/or fever over 101   Persistent vomiting   Severe abdominal pain, other than gas cramps   Severe chest pain   Black, tarry stools   Any bleeding - exceeding one tablespoon  Your doctor recommends these additional instructions:  You are being discharged to home.   Eat a high fiber diet.   Your physician has recommended a repeat colonoscopy in 10 years for   screening purposes.  None  If you have any questions or problems, please call your physician.  EMERGENCY PHONE NUMBER: [phone]  LAB RESULTS: Call in two (2) weeks for lab results, [phone]  ___________________________________________  Nurse Signature  ___________________________________________  Patient/Designated Responsible Party Signature  Cameron Bradshaw MD  11/7/2022 12:44:32 PM  This report has  been verified and signed electronically.  Dear patient,  As a result of recent federal legislation (The Federal Cures Act), you may   receive lab or pathology results from your procedure in your [facility]   account before your physician is able to contact you. Your physician or   their representative will relay the results to you with their   recommendations at their soonest availability.  Thank you.  [employer]

## 2022-11-07 NOTE — PATIENT INSTRUCTIONS
Recovery After Procedural Sedation (Adult)   You have been given medicine by vein to make you sleep during your surgery. This may have included both a pain medicine and sleeping medicine. Most of the effects have worn off. But you may still have some drowsiness for the next 6 to 8 hours.  Home care  Follow these guidelines when you get home:  For the next 8 hours, you should be watched by a responsible adult. This person should make sure your condition is not getting worse.  Don't drink any alcohol for the next 24 hours.  Don't drive, operate dangerous machinery, or make important business or personal decisions during the next 24 hours.  To prevent injury or falls, use caution when standing and walking for at least 24 hours after your procedure.  Note: Your healthcare provider may tell you not to take any medicine by mouth for pain or sleep in the next 4 hours. These medicines may react with the medicines you were given in the hospital. This could cause a much stronger response than usual.  Follow-up care  Follow up with your healthcare provider if you are not alert and back to your usual level of activity within 12 hours.  When to seek medical advice  Call your healthcare provider right away if any of these occur:  Drowsiness gets worse  Weakness or dizziness gets worse  Repeated vomiting  You can't be awakened  Fever  New rash  [employer] last reviewed this educational content on 9/1/2019  © 6323-8098 [employer]. [address] [place] [address], [place] [id number]. All rights reserved. This information is not intended as a substitute for professional medical care. Always follow your healthcare professional's instructions         High-Fiber Diet  Fiber is in fruits, vegetables, cereals, and grains. Fiber passes through your body undigested. A high-fiber diet helps food move through your intestinal tract. The added bulk is helpful in preventing constipation. In people with diverticulosis, fiber helps clean out  the pouches along the colon wall. It also prevents new pouches from forming. A high-fiber diet reduces the risk of colon cancer. It also lowers blood cholesterol and prevents high blood sugar in people with diabetes.    The fiber-rich foods listed below should be part of your diet. If you are not used to high-fiber foods, start with 1 or 2 foods from this list. Every 3 to 4 days add a new one to your diet. Do this until you are eating 4 high-fiber foods per day. This should give you 20 to 35 grams of fiber a day. It is also important to drink a lot of water when you are on this diet. You should have 6 to 8 glasses of water a day. Water makes the fiber swell and increases the benefit.  Foods high in dietary fiber  The following foods are high in dietary fiber:  Breads. Breads made with 100% whole-wheat flour; vickie, wheat, or rye crackers; whole-grain tortillas, bran muffins.  Cereals. Whole-grain and bran cereals with bran (shredded wheat, wheat flakes, raisin bran, corn bran); oatmeal, rolled oats, granola, and brown rice.  Fruits. Fresh fruits and their edible skins (pears, prunes, raisins, berries, apples, and apricots); bananas, citrus fruit, mangoes, pineapple; and prune juice.  Nuts. Any nuts and seeds.  Vegetables. Best served raw or lightly cooked. All types, especially: green peas, celery, eggplant, potatoes, spinach, broccoli, [place] sprouts, winter squash, carrots, cauliflower, soybeans, lentils, and fresh and dried beans of all kinds.  Other. Popcorn, any spices.  Date Last Reviewed: 8/1/2016  © 6685-7268 [employer]. [address] [place] [address], [place] [address]. All rights reserved. This information is not intended as a substitute for professional medical care. Always follow your healthcare professional's instructions.

## 2022-11-07 NOTE — BRIEF OP NOTE
Discharge Note  Short Stay      SUMMARY     Admit Date: 11/7/2022    Attending Physician: Cameron Bradshaw Jr., MD     Discharge Physician: Cameron Bradshaw Jr., MD    Discharge Date: 11/7/2022 12:46 PM    Final Diagnosis: Screening for colon cancer [Z12.11]    Impression:            - Non-bleeding internal hemorrhoids.                          - Redundant colon.                          - The examination was otherwise normal.                          - The examined portion of the ileum was normal.                          - No specimens collected.   Recommendation:        - Discharge patient to home.                          - High fiber diet.                          - Repeat colonoscopy in 10 years for screening                          purposes.                          - Continue present medications.                          - Patient has a contact number available for                          emergencies. The signs and symptoms of potential                          delayed complications were discussed with the                          patient. Return to normal activities tomorrow.                          Written discharge instructions were provided to                          the patient.                          - Return to normal activities tomorrow.   Cameron Bradshaw MD   11/7/2022       Disposition: HOME OR SELF CARE    Patient Instructions:   Current Discharge Medication List        CONTINUE these medications which have NOT CHANGED    Details   aspirin (ECOTRIN) 81 MG EC tablet Take 81 mg by mouth once daily.      atorvastatin (LIPITOR) 20 MG tablet Take 1 tablet (20 mg total) by mouth once daily.  Qty: 90 tablet, Refills: 3    Associated Diagnoses: Hyperlipidemia LDL goal <130      carvediloL (COREG) 6.25 MG tablet Take 3.125 mg by mouth 2 (two) times daily with meals.      fluticasone propionate (FLONASE) 50 mcg/actuation nasal spray 1 spray (50 mcg total) by Each Nostril route 2 (two) times a  day.  Qty: 16 g, Refills: 2    Associated Diagnoses: Acute pansinusitis, recurrence not specified      latanoprost 0.005 % ophthalmic solution Place 1 drop into both eyes every evening.      losartan-hydrochlorothiazide 100-25 mg (HYZAAR) 100-25 mg per tablet TAKE 1 TABLET BY MOUTH EVERY DAY  Qty: 30 tablet, Refills: 6      pantoprazole (PROTONIX) 40 MG tablet Take 1 tablet (40 mg total) by mouth once daily.  Qty: 90 tablet, Refills: 1    Associated Diagnoses: Gastroesophageal reflux disease, unspecified whether esophagitis present      EPINEPHrine (EPIPEN) 0.3 mg/0.3 mL AtIn              Discharge Procedure Orders (must include Diet, Follow-up, Activity)    Follow Up:  Follow up with PCP as per your routine.  Please follow a high fiber diet.  Activity as tolerated.    No driving day of procedure.

## 2022-11-07 NOTE — PLAN OF CARE
VSS, all questions answered. Denies recent fever or illness. Pt unable to remove yellow colored band to left ring finger, risk of burns verbalized to pt, pt verbalized understanding. Pt states ready for procedure.

## 2022-11-08 NOTE — TRANSFER OF CARE
Anesthesia Transfer of Care Note    Patient: Malcom Ho    Procedure(s) Performed: Procedure(s) (LRB):  COLONOSCOPY (N/A)    Patient location: PACU    Anesthesia Type: general    Transport from OR: Transported from OR on room air with adequate spontaneous ventilation    Post pain: adequate analgesia    Post assessment: no apparent anesthetic complications and tolerated procedure well    Post vital signs: stable    Level of consciousness: awake and sedated    Nausea/Vomiting: no nausea/vomiting    Complications: none    Transfer of care protocol was followed      Last vitals:   Visit Vitals  /68 (BP Location: Left arm, Patient Position: Sitting)   Pulse 62   Temp 36.8 °C (98.2 °F)   Resp 16   Ht 6' (1.829 m)   Wt 91.6 kg (202 lb)   SpO2 96%   BMI 27.40 kg/m²

## 2022-11-08 NOTE — ANESTHESIA POSTPROCEDURE EVALUATION
Anesthesia Post Evaluation    Patient: Malcom Ho    Procedure(s) Performed: Procedure(s) (LRB):  COLONOSCOPY (N/A)    Final Anesthesia Type: general      Patient location during evaluation: PACU  Patient participation: Yes- Able to Participate  Level of consciousness: awake and alert and oriented  Post-procedure vital signs: reviewed and stable  Pain management: adequate  Airway patency: patent    PONV status at discharge: No PONV  Anesthetic complications: no      Cardiovascular status: blood pressure returned to baseline  Respiratory status: unassisted, spontaneous ventilation and room air  Hydration status: euvolemic  Follow-up not needed.          Vitals Value Taken Time   /68 11/07/22 1249   Temp  11/08/22 1146   Pulse 62 11/07/22 1249   Resp 16 11/07/22 1249   SpO2 96 % 11/07/22 1249         Event Time   Out of Recovery 12:56:00         Pain/Ene Score: Ene Score: 10 (11/7/2022 12:48 PM)

## 2022-11-08 NOTE — ANESTHESIA PREPROCEDURE EVALUATION
11/08/2022  Malcom Ho is a 66 y.o., male.      Pre-op Assessment    I have reviewed the NPO Status.   I have reviewed the Medications.     Review of Systems  Cardiovascular:   Hypertension    Hepatic/GI:   Bowel Prep. GERD    Musculoskeletal:   Arthritis     Neurological:   Neuromuscular Disease,        Physical Exam  General: Well nourished        Anesthesia Plan  Type of Anesthesia, risks & benefits discussed:    Anesthesia Type: Gen Natural Airway  Intra-op Monitoring Plan: Standard ASA Monitors  Induction:  IV  Informed Consent: Informed consent signed with the Patient and all parties understand the risks and agree with anesthesia plan.  All questions answered.   ASA Score: 2    Ready For Surgery From Anesthesia Perspective.     .

## 2022-12-19 ENCOUNTER — TELEPHONE (OUTPATIENT)
Dept: CARDIOLOGY | Facility: CLINIC | Age: 67
End: 2022-12-19
Payer: MEDICARE

## 2022-12-19 NOTE — TELEPHONE ENCOUNTER
----- Message from Aliyah White sent at 12/19/2022 11:16 AM CST -----  Contact: Self  Type:  Sooner Appointment Request    Caller is requesting a sooner appointment.  Caller declined first available appointment listed below.  Caller will not accept being placed on the waitlist and is requesting a message be sent to doctor.    Name of Caller:  Patient  When is the first available appointment?  03/14  Symptoms:  irregular heartbeats, short winded  Best Call Back Number:  [phone]  Additional Information:  Thank You.

## 2023-01-04 ENCOUNTER — OFFICE VISIT (OUTPATIENT)
Dept: CARDIOLOGY | Facility: CLINIC | Age: 68
End: 2023-01-04
Payer: MEDICARE

## 2023-01-04 VITALS
WEIGHT: 203.06 LBS | SYSTOLIC BLOOD PRESSURE: 135 MMHG | HEART RATE: 76 BPM | HEIGHT: 72 IN | BODY MASS INDEX: 27.5 KG/M2 | DIASTOLIC BLOOD PRESSURE: 76 MMHG

## 2023-01-04 DIAGNOSIS — I10 HYPERTENSION, UNSPECIFIED TYPE: ICD-10-CM

## 2023-01-04 DIAGNOSIS — R07.9 CHEST PAIN, UNSPECIFIED TYPE: Primary | ICD-10-CM

## 2023-01-04 PROCEDURE — 1160F PR REVIEW ALL MEDS BY PRESCRIBER/CLIN PHARMACIST DOCUMENTED: ICD-10-PCS | Mod: CPTII,S$GLB,, | Performed by: INTERNAL MEDICINE

## 2023-01-04 PROCEDURE — 3078F PR MOST RECENT DIASTOLIC BLOOD PRESSURE < 80 MM HG: ICD-10-PCS | Mod: CPTII,S$GLB,, | Performed by: INTERNAL MEDICINE

## 2023-01-04 PROCEDURE — 99999 PR PBB SHADOW E&M-EST. PATIENT-LVL III: CPT | Mod: PBBFAC,,, | Performed by: INTERNAL MEDICINE

## 2023-01-04 PROCEDURE — 99204 PR OFFICE/OUTPT VISIT, NEW, LEVL IV, 45-59 MIN: ICD-10-PCS | Mod: S$GLB,,, | Performed by: INTERNAL MEDICINE

## 2023-01-04 PROCEDURE — 99204 OFFICE O/P NEW MOD 45 MIN: CPT | Mod: S$GLB,,, | Performed by: INTERNAL MEDICINE

## 2023-01-04 PROCEDURE — 1101F PT FALLS ASSESS-DOCD LE1/YR: CPT | Mod: CPTII,S$GLB,, | Performed by: INTERNAL MEDICINE

## 2023-01-04 PROCEDURE — 3075F PR MOST RECENT SYSTOLIC BLOOD PRESS GE 130-139MM HG: ICD-10-PCS | Mod: CPTII,S$GLB,, | Performed by: INTERNAL MEDICINE

## 2023-01-04 PROCEDURE — 3078F DIAST BP <80 MM HG: CPT | Mod: CPTII,S$GLB,, | Performed by: INTERNAL MEDICINE

## 2023-01-04 PROCEDURE — 1126F AMNT PAIN NOTED NONE PRSNT: CPT | Mod: CPTII,S$GLB,, | Performed by: INTERNAL MEDICINE

## 2023-01-04 PROCEDURE — 1101F PR PT FALLS ASSESS DOC 0-1 FALLS W/OUT INJ PAST YR: ICD-10-PCS | Mod: CPTII,S$GLB,, | Performed by: INTERNAL MEDICINE

## 2023-01-04 PROCEDURE — 3288F PR FALLS RISK ASSESSMENT DOCUMENTED: ICD-10-PCS | Mod: CPTII,S$GLB,, | Performed by: INTERNAL MEDICINE

## 2023-01-04 PROCEDURE — 1160F RVW MEDS BY RX/DR IN RCRD: CPT | Mod: CPTII,S$GLB,, | Performed by: INTERNAL MEDICINE

## 2023-01-04 PROCEDURE — 1159F MED LIST DOCD IN RCRD: CPT | Mod: CPTII,S$GLB,, | Performed by: INTERNAL MEDICINE

## 2023-01-04 PROCEDURE — 3075F SYST BP GE 130 - 139MM HG: CPT | Mod: CPTII,S$GLB,, | Performed by: INTERNAL MEDICINE

## 2023-01-04 PROCEDURE — 1159F PR MEDICATION LIST DOCUMENTED IN MEDICAL RECORD: ICD-10-PCS | Mod: CPTII,S$GLB,, | Performed by: INTERNAL MEDICINE

## 2023-01-04 PROCEDURE — 3008F BODY MASS INDEX DOCD: CPT | Mod: CPTII,S$GLB,, | Performed by: INTERNAL MEDICINE

## 2023-01-04 PROCEDURE — 3288F FALL RISK ASSESSMENT DOCD: CPT | Mod: CPTII,S$GLB,, | Performed by: INTERNAL MEDICINE

## 2023-01-04 PROCEDURE — 99999 PR PBB SHADOW E&M-EST. PATIENT-LVL III: ICD-10-PCS | Mod: PBBFAC,,, | Performed by: INTERNAL MEDICINE

## 2023-01-04 PROCEDURE — 3008F PR BODY MASS INDEX (BMI) DOCUMENTED: ICD-10-PCS | Mod: CPTII,S$GLB,, | Performed by: INTERNAL MEDICINE

## 2023-01-04 PROCEDURE — 1126F PR PAIN SEVERITY QUANTIFIED, NO PAIN PRESENT: ICD-10-PCS | Mod: CPTII,S$GLB,, | Performed by: INTERNAL MEDICINE

## 2023-01-04 RX ORDER — AMLODIPINE BESYLATE 5 MG/1
5 TABLET ORAL DAILY
Qty: 30 TABLET | Refills: 11 | Status: SHIPPED | OUTPATIENT
Start: 2023-01-04 | End: 2023-10-30 | Stop reason: SDUPTHER

## 2023-01-04 NOTE — PROGRESS NOTES
Subjective:    Patient ID:  Malcom Ho is a 67 y.o. male patient here for evaluation Establish Care (Had stress test in November after going to the ER for hypertension/SOB) and Shortness of Breath (After exercising since he has had sinus surgery )      History of Present Illness:   New PATIENT CARDIAC EVALUATION.  ISSUES SHORTNESS OF BREATH AND CHEST PAIN THAT BEGAN IN 11/2022.      Patient presents to [place] in November, underwent noninvasive cardiac assessment via nuclear stress test and echo.  Echo preserved ejection fraction, no structural heart issues.  Follow-up Lexiscan with equivocal findings, possibly attenuation artifact.  Since that time no recurrent chest pain.  Main problem now is dyspnea with exertion.  No PND orthopnea.  No edema.    Positive family history of hypertension, dyslipidemia.  No tobacco use. No history of pulmonary disease.        No prior history of CVA Ca.  No DVT PE.  No chronic kidney disease.  No peptic disease.             Review of patient's allergies indicates:  No Known Allergies    Past Medical History:   Diagnosis Date    GERD (gastroesophageal reflux disease)     Hypertension     Osteoarthritis of both hands     Rupture of left long head biceps tendon 05/2019    Urinary frequency      Past Surgical History:   Procedure Laterality Date    COLONOSCOPY  2006    COLONOSCOPY N/A 11/7/2022    Procedure: COLONOSCOPY;  Surgeon: Cameron Bradshaw Jr., MD;  Location: [facility];  Service: Endoscopy;  Laterality: N/A;    INCISION OF UVULA      KNEE SURGERY Right      Social History     Tobacco Use    Smoking status: Never     Passive exposure: Never    Smokeless tobacco: Never   Substance Use Topics    Alcohol use: Yes     Alcohol/week: 5.0 standard drinks     Types: 3 Glasses of wine, 2 Shots of liquor per week    Drug use: No        Review of Systems:    As noted in HPI in addition         REVIEW OF SYSTEMS  Review of Systems   Constitutional: Negative for decreased appetite,  diaphoresis, night sweats, weight gain and weight loss.   HENT:  Negative for nosebleeds and odynophagia.    Eyes:  Negative for double vision and photophobia.   Cardiovascular:  Positive for chest pain. Negative for claudication, cyanosis, dyspnea on exertion, irregular heartbeat, leg swelling, near-syncope, orthopnea, palpitations, paroxysmal nocturnal dyspnea and syncope.   Respiratory:  Positive for shortness of breath. Negative for cough, hemoptysis and wheezing.    Hematologic/Lymphatic: Negative for adenopathy.   Skin:  Negative for flushing, skin cancer and suspicious lesions.   Musculoskeletal:  Negative for gout, myalgias and neck pain.   Gastrointestinal:  Negative for abdominal pain, heartburn, hematemesis and hematochezia.   Genitourinary:  Negative for bladder incontinence, hesitancy and nocturia.   Neurological:  Negative for focal weakness, headaches, light-headedness and paresthesias.   Psychiatric/Behavioral:  Negative for memory loss and substance abuse.      Objective:        Vitals:    01/04/23 0903   BP: 135/76   Pulse: 76       Lab Results   Component Value Date    WBC 5.13 06/23/2020    HGB 15.3 06/23/2020    HCT 46.9 06/23/2020     06/23/2020    CHOL 156 04/22/2021    TRIG 61 04/22/2021    HDL 76 (H) 04/22/2021    ALT 35 05/28/2021    AST 39 05/28/2021     05/28/2021    K 4.1 05/28/2021     05/28/2021    CREATININE 0.82 05/28/2021    BUN 16 05/28/2021    CO2 29 05/28/2021    TSH 1.430 02/02/2019    PSA 1.0 05/13/2020      CARDIOGRAM RESULTS  Results for orders placed during the hospital encounter of 03/09/21    Echo Color Flow Doppler? Yes    Interpretation Summary  · Normal systolic function. The estimated ejection fraction is 60%  · Grade I left ventricular diastolic dysfunction.  · Normal right ventricular size with normal right ventricular systolic function.  · Mild left atrial enlargement.        CURRENT/PREVIOUS VISIT EKG  No results found for this or any previous  visit.  No valid procedures specified.   No results found for this or any previous visit.    No valid procedures specified.    PHYSICAL EXAM  CONSTITUTIONAL: Well built, well nourished in no apparent distress  NECK: no carotid bruit, no JVD  LUNGS: CTA  CHEST WALL: no tenderness,  HEART: regular rate and rhythm, S1, S2 normal, no murmur, click, rub or gallop   ABDOMEN: soft, non-tender; bowel sounds normal; no masses,  no organomegaly  EXTREMITIES: Extremities normal, no edema, no calf tenderness noted  VASCULAR EXAM: 2 PLUS UPPER AND LOWER EXT PULSES  NEURO: AAO X 3, NO ACUTE FOCAL OR LATERALIZING FINDINGS    I HAVE REVIEWED :    The vital signs, nurses notes, and all the pertinent radiology and labs.         Current Outpatient Medications   Medication Instructions    aspirin (ECOTRIN) 81 mg, Oral, Daily    atorvastatin (LIPITOR) 20 mg, Oral, Daily    carvediloL (COREG) 3.125 mg, Oral, 2 times daily with meals    EPINEPHrine (EPIPEN) 0.3 mg/0.3 mL AtIn No dose, route, or frequency recorded.    fluticasone propionate (FLONASE) 50 mcg, Each Nostril, 2 times daily    latanoprost 0.005 % ophthalmic solution 1 drop, Both Eyes, Nightly    losartan-hydrochlorothiazide 100-25 mg (HYZAAR) 100-25 mg per tablet TAKE 1 TABLET BY MOUTH EVERY DAY    pantoprazole (PROTONIX) 40 mg, Oral, Daily          Assessment:    Episode of chest pain shortness breath occurring in November 2022.  ER evaluation at [facility], unremarkable noninvasive cardiac assessment.      Continue problems of dyspnea with exertion.  No recurrent chest pain.  Only medication  change include the addition of Coreg 3.125 b.I.d. to current antihypertensive regime   Hypertension, dyslipidemia, positive family history heart disease.      Plan:       Change Coreg to Norvasc 5 daily, continue Hyzaar 100/25 daily, continue Lipitor 20 daily.      Return to clinic in approximately 1 week, EF symptoms do not rich will recommend further evaluation . I.e. left  heart catheterization and possible pulmonary function studies.        No follow-ups on file.

## 2023-01-12 ENCOUNTER — OFFICE VISIT (OUTPATIENT)
Dept: CARDIOLOGY | Facility: CLINIC | Age: 68
End: 2023-01-12
Payer: MEDICARE

## 2023-01-12 ENCOUNTER — TELEPHONE (OUTPATIENT)
Dept: CARDIOLOGY | Facility: CLINIC | Age: 68
End: 2023-01-12

## 2023-01-12 VITALS
BODY MASS INDEX: 27.29 KG/M2 | HEIGHT: 72 IN | SYSTOLIC BLOOD PRESSURE: 124 MMHG | HEART RATE: 66 BPM | DIASTOLIC BLOOD PRESSURE: 69 MMHG | WEIGHT: 201.5 LBS

## 2023-01-12 DIAGNOSIS — R07.9 CHEST PAIN, UNSPECIFIED TYPE: Primary | ICD-10-CM

## 2023-01-12 DIAGNOSIS — I10 HYPERTENSION, UNSPECIFIED TYPE: Primary | ICD-10-CM

## 2023-01-12 DIAGNOSIS — R07.9 CHEST PAIN, UNSPECIFIED TYPE: ICD-10-CM

## 2023-01-12 PROCEDURE — 3074F SYST BP LT 130 MM HG: CPT | Mod: CPTII,S$GLB,, | Performed by: INTERNAL MEDICINE

## 2023-01-12 PROCEDURE — 3288F PR FALLS RISK ASSESSMENT DOCUMENTED: ICD-10-PCS | Mod: CPTII,S$GLB,, | Performed by: INTERNAL MEDICINE

## 2023-01-12 PROCEDURE — 3008F PR BODY MASS INDEX (BMI) DOCUMENTED: ICD-10-PCS | Mod: CPTII,S$GLB,, | Performed by: INTERNAL MEDICINE

## 2023-01-12 PROCEDURE — 1101F PR PT FALLS ASSESS DOC 0-1 FALLS W/OUT INJ PAST YR: ICD-10-PCS | Mod: CPTII,S$GLB,, | Performed by: INTERNAL MEDICINE

## 2023-01-12 PROCEDURE — 99999 PR PBB SHADOW E&M-EST. PATIENT-LVL III: CPT | Mod: PBBFAC,,, | Performed by: INTERNAL MEDICINE

## 2023-01-12 PROCEDURE — 1101F PT FALLS ASSESS-DOCD LE1/YR: CPT | Mod: CPTII,S$GLB,, | Performed by: INTERNAL MEDICINE

## 2023-01-12 PROCEDURE — 3078F DIAST BP <80 MM HG: CPT | Mod: CPTII,S$GLB,, | Performed by: INTERNAL MEDICINE

## 2023-01-12 PROCEDURE — 1126F PR PAIN SEVERITY QUANTIFIED, NO PAIN PRESENT: ICD-10-PCS | Mod: CPTII,S$GLB,, | Performed by: INTERNAL MEDICINE

## 2023-01-12 PROCEDURE — 99999 PR PBB SHADOW E&M-EST. PATIENT-LVL III: ICD-10-PCS | Mod: PBBFAC,,, | Performed by: INTERNAL MEDICINE

## 2023-01-12 PROCEDURE — 99214 OFFICE O/P EST MOD 30 MIN: CPT | Mod: S$GLB,,, | Performed by: INTERNAL MEDICINE

## 2023-01-12 PROCEDURE — 3288F FALL RISK ASSESSMENT DOCD: CPT | Mod: CPTII,S$GLB,, | Performed by: INTERNAL MEDICINE

## 2023-01-12 PROCEDURE — 1159F MED LIST DOCD IN RCRD: CPT | Mod: CPTII,S$GLB,, | Performed by: INTERNAL MEDICINE

## 2023-01-12 PROCEDURE — 99214 PR OFFICE/OUTPT VISIT, EST, LEVL IV, 30-39 MIN: ICD-10-PCS | Mod: S$GLB,,, | Performed by: INTERNAL MEDICINE

## 2023-01-12 PROCEDURE — 3078F PR MOST RECENT DIASTOLIC BLOOD PRESSURE < 80 MM HG: ICD-10-PCS | Mod: CPTII,S$GLB,, | Performed by: INTERNAL MEDICINE

## 2023-01-12 PROCEDURE — 1126F AMNT PAIN NOTED NONE PRSNT: CPT | Mod: CPTII,S$GLB,, | Performed by: INTERNAL MEDICINE

## 2023-01-12 PROCEDURE — 1159F PR MEDICATION LIST DOCUMENTED IN MEDICAL RECORD: ICD-10-PCS | Mod: CPTII,S$GLB,, | Performed by: INTERNAL MEDICINE

## 2023-01-12 PROCEDURE — 3074F PR MOST RECENT SYSTOLIC BLOOD PRESSURE < 130 MM HG: ICD-10-PCS | Mod: CPTII,S$GLB,, | Performed by: INTERNAL MEDICINE

## 2023-01-12 PROCEDURE — 3008F BODY MASS INDEX DOCD: CPT | Mod: CPTII,S$GLB,, | Performed by: INTERNAL MEDICINE

## 2023-01-12 NOTE — PROGRESS NOTES
Subjective:    Patient ID:  Malcom Ho is a 67 y.o. male patient here for evaluation Follow-up (1WK)      History of Present Illness:  Cardiology follow-up.  Past recent noninvasive cardiac assessment for ischemic or structural heart issues.  Patient is symptomatic with TORRES, occasional post exercise arrhythmia.  No definite syncope/presyncope.  Positive family history hypertension.  Last visit changed Coreg to Norvasc, now off beta-blockers with minimal improvement.  Blood pressure overall remains fairly well controlled.             Review of patient's allergies indicates:  No Known Allergies    Past Medical History:   Diagnosis Date    GERD (gastroesophageal reflux disease)     Hypertension     Osteoarthritis of both hands     Rupture of left long head biceps tendon 05/2019    Urinary frequency      Past Surgical History:   Procedure Laterality Date    COLONOSCOPY  2006    COLONOSCOPY N/A 11/7/2022    Procedure: COLONOSCOPY;  Surgeon: Cameron Bradshaw Jr., MD;  Location: [facility];  Service: Endoscopy;  Laterality: N/A;    INCISION OF UVULA      KNEE SURGERY Right      Social History     Tobacco Use    Smoking status: Never     Passive exposure: Never    Smokeless tobacco: Never   Substance Use Topics    Alcohol use: Yes     Alcohol/week: 5.0 standard drinks     Types: 3 Glasses of wine, 2 Shots of liquor per week    Drug use: No        Review of Systems:    As noted in HPI in addition      REVIEW OF SYSTEMS  Review of Systems   Constitutional: Negative for decreased appetite, diaphoresis, night sweats, weight gain and weight loss.   HENT:  Negative for nosebleeds and odynophagia.    Eyes:  Negative for double vision and photophobia.   Cardiovascular:  Negative for chest pain, claudication, cyanosis, dyspnea on exertion, irregular heartbeat, leg swelling, near-syncope, orthopnea, palpitations, paroxysmal nocturnal dyspnea and syncope.   Respiratory:  Positive for shortness of breath. Negative for cough,  hemoptysis and wheezing.    Hematologic/Lymphatic: Negative for adenopathy.   Skin:  Negative for flushing, skin cancer and suspicious lesions.   Musculoskeletal:  Negative for gout, myalgias and neck pain.   Gastrointestinal:  Negative for abdominal pain, heartburn, hematemesis and hematochezia.   Genitourinary:  Negative for bladder incontinence, hesitancy and nocturia.   Neurological:  Negative for focal weakness, headaches, light-headedness and paresthesias.   Psychiatric/Behavioral:  Negative for memory loss and substance abuse.             Objective:        Vitals:    01/12/23 0827   BP: 124/69   Pulse: 66       Lab Results   Component Value Date    WBC 5.13 06/23/2020    HGB 15.3 06/23/2020    HCT 46.9 06/23/2020     06/23/2020    CHOL 156 04/22/2021    TRIG 61 04/22/2021    HDL 76 (H) 04/22/2021    ALT 35 05/28/2021    AST 39 05/28/2021     05/28/2021    K 4.1 05/28/2021     05/28/2021    CREATININE 0.82 05/28/2021    BUN 16 05/28/2021    CO2 29 05/28/2021    TSH 1.430 02/02/2019    PSA 1.0 05/13/2020        ECHOCARDIOGRAM RESULTS  Results for orders placed during the hospital encounter of 03/09/21    Echo Color Flow Doppler? Yes    Interpretation Summary  · Normal systolic function. The estimated ejection fraction is 60%  · Grade I left ventricular diastolic dysfunction.  · Normal right ventricular size with normal right ventricular systolic function.  · Mild left atrial enlargement.        CURRENT/PREVIOUS VISIT EKG  No results found for this or any previous visit.  No valid procedures specified.   No results found for this or any previous visit.    No valid procedures specified.    PHYSICAL EXAM  CONSTITUTIONAL: Well built, well nourished in no apparent distress  NECK: no carotid bruit, no JVD  LUNGS: CTA  CHEST WALL: no tenderness,  HEART: regular rate and rhythm, S1, S2 normal, no murmur, click, rub or gallop   ABDOMEN: soft, non-tender; bowel sounds normal; no masses,  no  organomegaly  EXTREMITIES: Extremities normal, no edema, no calf tenderness noted  NEURO: AAO X 3    I HAVE REVIEWED :    The vital signs, nurses notes, and all the pertinent radiology and labs.         Current Outpatient Medications   Medication Instructions    amLODIPine (NORVASC) 5 mg, Oral, Daily    aspirin (ECOTRIN) 81 mg, Oral, Daily    atorvastatin (LIPITOR) 20 mg, Oral, Daily    carvediloL (COREG) 3.125 mg, Oral, 2 times daily with meals    EPINEPHrine (EPIPEN) 0.3 mg/0.3 mL AtIn No dose, route, or frequency recorded.    fluticasone propionate (FLONASE) 50 mcg, Each Nostril, 2 times daily    latanoprost 0.005 % ophthalmic solution 1 drop, Both Eyes, Nightly    losartan-hydrochlorothiazide 100-25 mg (HYZAAR) 100-25 mg per tablet TAKE 1 TABLET BY MOUTH EVERY DAY    pantoprazole (PROTONIX) 40 mg, Oral, Daily          Assessment:   Hypertension  Dyslipidemia  Atypical cardiac symptoms with underlying positive family history.        Plan:   Suggest further evaluation via coronary CTA.  Continue to monitor blood pressures pre and post exercise.  Follow up results.          No follow-ups on file.

## 2023-01-12 NOTE — TELEPHONE ENCOUNTER
----- Message from Ellen Tabares, Patient Care Assistant sent at 1/12/2023  4:22 PM CST -----  Contact: self  Pt is calling because [facility] has not received his orders for a Cardiac CTA. Please call back to advise [phone]  thanks

## 2023-02-08 ENCOUNTER — PATIENT MESSAGE (OUTPATIENT)
Dept: CARDIOLOGY | Facility: CLINIC | Age: 68
End: 2023-02-08
Payer: MEDICARE

## 2023-02-09 ENCOUNTER — PATIENT MESSAGE (OUTPATIENT)
Dept: CARDIOLOGY | Facility: CLINIC | Age: 68
End: 2023-02-09
Payer: MEDICARE

## 2023-02-22 ENCOUNTER — OFFICE VISIT (OUTPATIENT)
Dept: CARDIOLOGY | Facility: CLINIC | Age: 68
End: 2023-02-22
Payer: MEDICARE

## 2023-02-22 VITALS
HEIGHT: 72 IN | HEART RATE: 65 BPM | SYSTOLIC BLOOD PRESSURE: 131 MMHG | DIASTOLIC BLOOD PRESSURE: 76 MMHG | BODY MASS INDEX: 26.78 KG/M2 | WEIGHT: 197.75 LBS

## 2023-02-22 DIAGNOSIS — R07.9 CHEST PAIN, UNSPECIFIED TYPE: ICD-10-CM

## 2023-02-22 DIAGNOSIS — R93.1 ABNORMAL CT SCAN, HEART: Primary | ICD-10-CM

## 2023-02-22 DIAGNOSIS — R06.09 DOE (DYSPNEA ON EXERTION): ICD-10-CM

## 2023-02-22 DIAGNOSIS — I10 HYPERTENSION, UNSPECIFIED TYPE: Primary | ICD-10-CM

## 2023-02-22 PROCEDURE — 3288F PR FALLS RISK ASSESSMENT DOCUMENTED: ICD-10-PCS | Mod: CPTII,S$GLB,, | Performed by: INTERNAL MEDICINE

## 2023-02-22 PROCEDURE — 3078F PR MOST RECENT DIASTOLIC BLOOD PRESSURE < 80 MM HG: ICD-10-PCS | Mod: CPTII,S$GLB,, | Performed by: INTERNAL MEDICINE

## 2023-02-22 PROCEDURE — 3075F PR MOST RECENT SYSTOLIC BLOOD PRESS GE 130-139MM HG: ICD-10-PCS | Mod: CPTII,S$GLB,, | Performed by: INTERNAL MEDICINE

## 2023-02-22 PROCEDURE — 1159F PR MEDICATION LIST DOCUMENTED IN MEDICAL RECORD: ICD-10-PCS | Mod: CPTII,S$GLB,, | Performed by: INTERNAL MEDICINE

## 2023-02-22 PROCEDURE — 1101F PR PT FALLS ASSESS DOC 0-1 FALLS W/OUT INJ PAST YR: ICD-10-PCS | Mod: CPTII,S$GLB,, | Performed by: INTERNAL MEDICINE

## 2023-02-22 PROCEDURE — 99214 OFFICE O/P EST MOD 30 MIN: CPT | Mod: S$GLB,,, | Performed by: INTERNAL MEDICINE

## 2023-02-22 PROCEDURE — 3008F BODY MASS INDEX DOCD: CPT | Mod: CPTII,S$GLB,, | Performed by: INTERNAL MEDICINE

## 2023-02-22 PROCEDURE — 1101F PT FALLS ASSESS-DOCD LE1/YR: CPT | Mod: CPTII,S$GLB,, | Performed by: INTERNAL MEDICINE

## 2023-02-22 PROCEDURE — 1160F RVW MEDS BY RX/DR IN RCRD: CPT | Mod: CPTII,S$GLB,, | Performed by: INTERNAL MEDICINE

## 2023-02-22 PROCEDURE — 3075F SYST BP GE 130 - 139MM HG: CPT | Mod: CPTII,S$GLB,, | Performed by: INTERNAL MEDICINE

## 2023-02-22 PROCEDURE — 99999 PR PBB SHADOW E&M-EST. PATIENT-LVL III: CPT | Mod: PBBFAC,,, | Performed by: INTERNAL MEDICINE

## 2023-02-22 PROCEDURE — 3078F DIAST BP <80 MM HG: CPT | Mod: CPTII,S$GLB,, | Performed by: INTERNAL MEDICINE

## 2023-02-22 PROCEDURE — 99214 PR OFFICE/OUTPT VISIT, EST, LEVL IV, 30-39 MIN: ICD-10-PCS | Mod: S$GLB,,, | Performed by: INTERNAL MEDICINE

## 2023-02-22 PROCEDURE — 3008F PR BODY MASS INDEX (BMI) DOCUMENTED: ICD-10-PCS | Mod: CPTII,S$GLB,, | Performed by: INTERNAL MEDICINE

## 2023-02-22 PROCEDURE — 1126F AMNT PAIN NOTED NONE PRSNT: CPT | Mod: CPTII,S$GLB,, | Performed by: INTERNAL MEDICINE

## 2023-02-22 PROCEDURE — 1160F PR REVIEW ALL MEDS BY PRESCRIBER/CLIN PHARMACIST DOCUMENTED: ICD-10-PCS | Mod: CPTII,S$GLB,, | Performed by: INTERNAL MEDICINE

## 2023-02-22 PROCEDURE — 99999 PR PBB SHADOW E&M-EST. PATIENT-LVL III: ICD-10-PCS | Mod: PBBFAC,,, | Performed by: INTERNAL MEDICINE

## 2023-02-22 PROCEDURE — 1159F MED LIST DOCD IN RCRD: CPT | Mod: CPTII,S$GLB,, | Performed by: INTERNAL MEDICINE

## 2023-02-22 PROCEDURE — 1126F PR PAIN SEVERITY QUANTIFIED, NO PAIN PRESENT: ICD-10-PCS | Mod: CPTII,S$GLB,, | Performed by: INTERNAL MEDICINE

## 2023-02-22 PROCEDURE — 3288F FALL RISK ASSESSMENT DOCD: CPT | Mod: CPTII,S$GLB,, | Performed by: INTERNAL MEDICINE

## 2023-02-22 RX ORDER — SODIUM CHLORIDE 9 MG/ML
INJECTION, SOLUTION INTRAVENOUS ONCE
Status: CANCELLED | OUTPATIENT
Start: 2023-02-22 | End: 2023-02-22

## 2023-02-22 RX ORDER — SODIUM CHLORIDE 0.9 % (FLUSH) 0.9 %
10 SYRINGE (ML) INJECTION
Status: DISCONTINUED | OUTPATIENT
Start: 2023-02-22 | End: 2023-02-24 | Stop reason: CLARIF

## 2023-02-22 NOTE — PROGRESS NOTES
Subjective:    Patient ID:  Malcom Ho is a 67 y.o. male patient here for evaluation Results      History of Present Illness:  Cardiology follow-up test results.  CTA with suggest of multivessel disease high calcium score.  Previous noninvasive cardiac assessment [facility], medical therapy recommended.  No records.  Underlying history of hypertension dyslipidemia family history.  Cardiac hemodynamics otherwise stable.  Patient symptomatic since November, nonprogressive, mainly TORRES.             Review of patient's allergies indicates:  No Known Allergies    Past Medical History:   Diagnosis Date    GERD (gastroesophageal reflux disease)     Hypertension     Osteoarthritis of both hands     Rupture of left long head biceps tendon 05/2019    Urinary frequency      Past Surgical History:   Procedure Laterality Date    COLONOSCOPY  2006    COLONOSCOPY N/A 11/7/2022    Procedure: COLONOSCOPY;  Surgeon: Cameron Bradshaw Jr., MD;  Location: [facility];  Service: Endoscopy;  Laterality: N/A;    INCISION OF UVULA      KNEE SURGERY Right      Social History     Tobacco Use    Smoking status: Never     Passive exposure: Never    Smokeless tobacco: Never   Substance Use Topics    Alcohol use: Yes     Alcohol/week: 5.0 standard drinks     Types: 3 Glasses of wine, 2 Shots of liquor per week    Drug use: No        Review of Systems:    As noted in HPI in addition      REVIEW OF SYSTEMS  Review of Systems   Constitutional: Negative for decreased appetite, diaphoresis, night sweats, weight gain and weight loss.   HENT:  Negative for nosebleeds and odynophagia.    Eyes:  Negative for double vision and photophobia.   Cardiovascular:  Positive for dyspnea on exertion. Negative for chest pain, claudication, cyanosis, irregular heartbeat, leg swelling, near-syncope, orthopnea, palpitations, paroxysmal nocturnal dyspnea and syncope.   Respiratory:  Negative for cough, hemoptysis, shortness of breath and wheezing.     Hematologic/Lymphatic: Negative for adenopathy.   Skin:  Negative for flushing, skin cancer and suspicious lesions.   Musculoskeletal:  Negative for gout, myalgias and neck pain.   Gastrointestinal:  Negative for abdominal pain, heartburn, hematemesis and hematochezia.   Genitourinary:  Negative for bladder incontinence, hesitancy and nocturia.   Neurological:  Negative for focal weakness, headaches, light-headedness and paresthesias.   Psychiatric/Behavioral:  Negative for memory loss and substance abuse.             Objective:        Vitals:    02/22/23 1059   BP: 131/76   Pulse: 65       Lab Results   Component Value Date    WBC 5.13 06/23/2020    HGB 15.3 06/23/2020    HCT 46.9 06/23/2020     06/23/2020    CHOL 156 04/22/2021    TRIG 61 04/22/2021    HDL 76 (H) 04/22/2021    ALT 35 05/28/2021    AST 39 05/28/2021     05/28/2021    K 4.1 05/28/2021     05/28/2021    CREATININE 0.81 01/31/2023    BUN 16 05/28/2021    CO2 29 05/28/2021    TSH 1.430 02/02/2019    PSA 1.0 05/13/2020        ECHOCARDIOGRAM RESULTS  Results for orders placed during the hospital encounter of 03/09/21    Echo Color Flow Doppler? Yes    Interpretation Summary  · Normal systolic function. The estimated ejection fraction is 60%  · Grade I left ventricular diastolic dysfunction.  · Normal right ventricular size with normal right ventricular systolic function.  · Mild left atrial enlargement.        CURRENT/PREVIOUS VISIT EKG  No results found for this or any previous visit.  No valid procedures specified.   No results found for this or any previous visit.    No valid procedures specified.    PHYSICAL EXAM  CONSTITUTIONAL: Well built, well nourished in no apparent distress  NECK: no carotid bruit, no JVD  LUNGS: CTA  CHEST WALL: no tenderness,  HEART: regular rate and rhythm, S1, S2 normal, no murmur, click, rub or gallop   ABDOMEN: soft, non-tender; bowel sounds normal; no masses,  no organomegaly  EXTREMITIES:  Extremities normal, no edema, no calf tenderness noted  NEURO: AAO X 3    I HAVE REVIEWED :    The vital signs, nurses notes, and all the pertinent radiology and labs.         Current Outpatient Medications   Medication Instructions    amLODIPine (NORVASC) 5 mg, Oral, Daily    aspirin (ECOTRIN) 81 mg, Oral, Daily    atorvastatin (LIPITOR) 20 MG tablet TAKE 1 TABLET(20 MG) BY MOUTH EVERY DAY    carvediloL (COREG) 3.125 mg, Oral, 2 times daily with meals    EPINEPHrine (EPIPEN) 0.3 mg/0.3 mL AtIn No dose, route, or frequency recorded.    fluticasone propionate (FLONASE) 50 mcg, Each Nostril, 2 times daily    latanoprost 0.005 % ophthalmic solution 1 drop, Both Eyes, Nightly    losartan-hydrochlorothiazide 100-25 mg (HYZAAR) 100-25 mg per tablet TAKE 1 TABLET BY MOUTH EVERY DAY    pantoprazole (PROTONIX) 40 mg, Oral, Daily          Assessment:   TORRES, angina equivalent.  Equivocal noninvasive assessment via previous GXT and echo at [facility], no records.  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  Prior echo with preserved ejection fraction.  Hypertension, dyslipidemia, family history.        Plan:   Follow-up left heart catheterization.  Further evaluation to follow.          No follow-ups on file.

## 2023-02-22 NOTE — PATIENT INSTRUCTIONS
Angiogram    Arrive for procedure at: [facility] MON. 2/27/23 @ 9 AM.  YOU MAY ENTER THROUGH THE MAIN ENTRANCE.  LET THEM KNOW YOU ARE THERE FOR AN OUTPATIENT PROCEDURE.  THE PROCEDURE WILL START AT 11 AM WITH DR. DONA US.    You will receive a phone call from [facility] Pre-Op Department with further instructions prior to your scheduled procedure.      FASTING: You MAY NOT have anything to eat or drink AFTER MIDNIGHT the day before your procedure.       MEDICATIONS: You may take your regular morning medications with water. If there are any medications that you should not take, you will be instructed to hold them for that morning.    CARDIOLOGY PRE-PROCEDURE MEDICATION ORDERS:  ** Please continue all medications as prescribed**        WHAT TO EXPECT:    How long will the procedure take?  The procedure will take an average of 1 - 2 hours to perform.  After the procedure, you will need to lay flat for around 4 - 6 hours to minimize bleeding from the puncture site. If the wrist is accessed you will need to keep your arm still as instructed by the nurse.    When can I go home?  You may be able to be discharged home that same afternoon if there were no complications.  If you have one of the following: balloon; stent; pacemaker or defibrillator procedures, you may spend one night for observation.  Your doctor will determine your discharge based upon your progress.  The results of your procedure will be discussed with you before you are discharged.  Any further testing or procedures will be scheduled for you either before you leave or you will be instructed to call for a future appointment.      TRANSPORTATION:  PLEASE ARRANGE TO HAVE SOMEONE DRIVE YOU HOME FOLLOWING YOUR PROCEDURE, YOU WILL NOT BE ALLOWED TO DRIVE.

## 2023-03-14 ENCOUNTER — PATIENT MESSAGE (OUTPATIENT)
Dept: CARDIOLOGY | Facility: CLINIC | Age: 68
End: 2023-03-14
Payer: MEDICARE

## 2023-03-15 ENCOUNTER — TELEPHONE (OUTPATIENT)
Dept: CARDIOLOGY | Facility: CLINIC | Age: 68
End: 2023-03-15
Payer: MEDICARE

## 2023-03-15 DIAGNOSIS — E78.5 HYPERLIPIDEMIA LDL GOAL <130: Primary | ICD-10-CM

## 2023-03-15 DIAGNOSIS — R06.09 DOE (DYSPNEA ON EXERTION): ICD-10-CM

## 2023-03-15 DIAGNOSIS — E78.00 PURE HYPERCHOLESTEROLEMIA: ICD-10-CM

## 2023-03-17 ENCOUNTER — LAB VISIT (OUTPATIENT)
Dept: LAB | Facility: HOSPITAL | Age: 68
End: 2023-03-17
Attending: INTERNAL MEDICINE
Payer: MEDICARE

## 2023-03-17 DIAGNOSIS — E78.5 HYPERLIPIDEMIA LDL GOAL <130: ICD-10-CM

## 2023-03-17 DIAGNOSIS — R06.09 DOE (DYSPNEA ON EXERTION): ICD-10-CM

## 2023-03-17 DIAGNOSIS — E78.00 PURE HYPERCHOLESTEROLEMIA: ICD-10-CM

## 2023-03-17 LAB
ALBUMIN SERPL BCP-MCNC: 3.8 G/DL (ref 3.5–5.2)
ALP SERPL-CCNC: 40 U/L (ref 55–135)
ALT SERPL W/O P-5'-P-CCNC: 39 U/L (ref 10–44)
ANION GAP SERPL CALC-SCNC: 7 MMOL/L (ref 8–16)
AST SERPL-CCNC: 36 U/L (ref 10–40)
BASOPHILS # BLD AUTO: 0.04 K/UL (ref 0–0.2)
BASOPHILS NFR BLD: 1 % (ref 0–1.9)
BILIRUB SERPL-MCNC: 1 MG/DL (ref 0.1–1)
BUN SERPL-MCNC: 25 MG/DL (ref 8–23)
CALCIUM SERPL-MCNC: 9.3 MG/DL (ref 8.7–10.5)
CHLORIDE SERPL-SCNC: 108 MMOL/L (ref 95–110)
CHOLEST SERPL-MCNC: 142 MG/DL (ref 120–199)
CHOLEST/HDLC SERPL: 2.7 {RATIO} (ref 2–5)
CO2 SERPL-SCNC: 28 MMOL/L (ref 23–29)
CREAT SERPL-MCNC: 0.9 MG/DL (ref 0.5–1.4)
DIFFERENTIAL METHOD: ABNORMAL
EOSINOPHIL # BLD AUTO: 0.1 K/UL (ref 0–0.5)
EOSINOPHIL NFR BLD: 2.9 % (ref 0–8)
ERYTHROCYTE [DISTWIDTH] IN BLOOD BY AUTOMATED COUNT: 12.7 % (ref 11.5–14.5)
EST. GFR  (NO RACE VARIABLE): >60 ML/MIN/1.73 M^2
GLUCOSE SERPL-MCNC: 99 MG/DL (ref 70–110)
HCT VFR BLD AUTO: 45.1 % (ref 40–54)
HDLC SERPL-MCNC: 52 MG/DL (ref 40–75)
HDLC SERPL: 36.6 % (ref 20–50)
HGB BLD-MCNC: 14.4 G/DL (ref 14–18)
IMM GRANULOCYTES # BLD AUTO: 0 K/UL (ref 0–0.04)
IMM GRANULOCYTES NFR BLD AUTO: 0 % (ref 0–0.5)
LDLC SERPL CALC-MCNC: 78.8 MG/DL (ref 63–159)
LYMPHOCYTES # BLD AUTO: 1.8 K/UL (ref 1–4.8)
LYMPHOCYTES NFR BLD: 43.7 % (ref 18–48)
MCH RBC QN AUTO: 30.8 PG (ref 27–31)
MCHC RBC AUTO-ENTMCNC: 31.9 G/DL (ref 32–36)
MCV RBC AUTO: 96 FL (ref 82–98)
MONOCYTES # BLD AUTO: 0.3 K/UL (ref 0.3–1)
MONOCYTES NFR BLD: 6.7 % (ref 4–15)
NEUTROPHILS # BLD AUTO: 1.9 K/UL (ref 1.8–7.7)
NEUTROPHILS NFR BLD: 45.7 % (ref 38–73)
NONHDLC SERPL-MCNC: 90 MG/DL
NRBC BLD-RTO: 0 /100 WBC
PLATELET # BLD AUTO: 178 K/UL (ref 150–450)
PMV BLD AUTO: 10.2 FL (ref 9.2–12.9)
POTASSIUM SERPL-SCNC: 4.3 MMOL/L (ref 3.5–5.1)
PROT SERPL-MCNC: 7.1 G/DL (ref 6–8.4)
RBC # BLD AUTO: 4.68 M/UL (ref 4.6–6.2)
SODIUM SERPL-SCNC: 143 MMOL/L (ref 136–145)
TRIGL SERPL-MCNC: 56 MG/DL (ref 30–150)
WBC # BLD AUTO: 4.19 K/UL (ref 3.9–12.7)

## 2023-03-17 PROCEDURE — 85025 COMPLETE CBC W/AUTO DIFF WBC: CPT | Performed by: INTERNAL MEDICINE

## 2023-03-17 PROCEDURE — 80053 COMPREHEN METABOLIC PANEL: CPT | Performed by: INTERNAL MEDICINE

## 2023-03-17 PROCEDURE — 36415 COLL VENOUS BLD VENIPUNCTURE: CPT | Mod: PO | Performed by: INTERNAL MEDICINE

## 2023-03-17 PROCEDURE — 80061 LIPID PANEL: CPT | Performed by: INTERNAL MEDICINE

## 2023-03-23 ENCOUNTER — OFFICE VISIT (OUTPATIENT)
Dept: CARDIOLOGY | Facility: CLINIC | Age: 68
End: 2023-03-23
Payer: MEDICARE

## 2023-03-23 VITALS
HEART RATE: 63 BPM | HEIGHT: 72 IN | BODY MASS INDEX: 26.22 KG/M2 | DIASTOLIC BLOOD PRESSURE: 76 MMHG | SYSTOLIC BLOOD PRESSURE: 124 MMHG | WEIGHT: 193.56 LBS

## 2023-03-23 DIAGNOSIS — R07.9 CHEST PAIN, UNSPECIFIED TYPE: ICD-10-CM

## 2023-03-23 DIAGNOSIS — I10 HYPERTENSION, UNSPECIFIED TYPE: Primary | ICD-10-CM

## 2023-03-23 PROCEDURE — 99999 PR PBB SHADOW E&M-EST. PATIENT-LVL III: ICD-10-PCS | Mod: PBBFAC,,, | Performed by: INTERNAL MEDICINE

## 2023-03-23 PROCEDURE — 3074F SYST BP LT 130 MM HG: CPT | Mod: CPTII,S$GLB,, | Performed by: INTERNAL MEDICINE

## 2023-03-23 PROCEDURE — 3078F PR MOST RECENT DIASTOLIC BLOOD PRESSURE < 80 MM HG: ICD-10-PCS | Mod: CPTII,S$GLB,, | Performed by: INTERNAL MEDICINE

## 2023-03-23 PROCEDURE — 1159F MED LIST DOCD IN RCRD: CPT | Mod: CPTII,S$GLB,, | Performed by: INTERNAL MEDICINE

## 2023-03-23 PROCEDURE — 3074F PR MOST RECENT SYSTOLIC BLOOD PRESSURE < 130 MM HG: ICD-10-PCS | Mod: CPTII,S$GLB,, | Performed by: INTERNAL MEDICINE

## 2023-03-23 PROCEDURE — 3078F DIAST BP <80 MM HG: CPT | Mod: CPTII,S$GLB,, | Performed by: INTERNAL MEDICINE

## 2023-03-23 PROCEDURE — 3288F FALL RISK ASSESSMENT DOCD: CPT | Mod: CPTII,S$GLB,, | Performed by: INTERNAL MEDICINE

## 2023-03-23 PROCEDURE — 1160F PR REVIEW ALL MEDS BY PRESCRIBER/CLIN PHARMACIST DOCUMENTED: ICD-10-PCS | Mod: CPTII,S$GLB,, | Performed by: INTERNAL MEDICINE

## 2023-03-23 PROCEDURE — 1160F RVW MEDS BY RX/DR IN RCRD: CPT | Mod: CPTII,S$GLB,, | Performed by: INTERNAL MEDICINE

## 2023-03-23 PROCEDURE — 1126F PR PAIN SEVERITY QUANTIFIED, NO PAIN PRESENT: ICD-10-PCS | Mod: CPTII,S$GLB,, | Performed by: INTERNAL MEDICINE

## 2023-03-23 PROCEDURE — 99214 PR OFFICE/OUTPT VISIT, EST, LEVL IV, 30-39 MIN: ICD-10-PCS | Mod: S$GLB,,, | Performed by: INTERNAL MEDICINE

## 2023-03-23 PROCEDURE — 3288F PR FALLS RISK ASSESSMENT DOCUMENTED: ICD-10-PCS | Mod: CPTII,S$GLB,, | Performed by: INTERNAL MEDICINE

## 2023-03-23 PROCEDURE — 99214 OFFICE O/P EST MOD 30 MIN: CPT | Mod: S$GLB,,, | Performed by: INTERNAL MEDICINE

## 2023-03-23 PROCEDURE — 1101F PT FALLS ASSESS-DOCD LE1/YR: CPT | Mod: CPTII,S$GLB,, | Performed by: INTERNAL MEDICINE

## 2023-03-23 PROCEDURE — 1159F PR MEDICATION LIST DOCUMENTED IN MEDICAL RECORD: ICD-10-PCS | Mod: CPTII,S$GLB,, | Performed by: INTERNAL MEDICINE

## 2023-03-23 PROCEDURE — 1101F PR PT FALLS ASSESS DOC 0-1 FALLS W/OUT INJ PAST YR: ICD-10-PCS | Mod: CPTII,S$GLB,, | Performed by: INTERNAL MEDICINE

## 2023-03-23 PROCEDURE — 99999 PR PBB SHADOW E&M-EST. PATIENT-LVL III: CPT | Mod: PBBFAC,,, | Performed by: INTERNAL MEDICINE

## 2023-03-23 PROCEDURE — 3008F PR BODY MASS INDEX (BMI) DOCUMENTED: ICD-10-PCS | Mod: CPTII,S$GLB,, | Performed by: INTERNAL MEDICINE

## 2023-03-23 PROCEDURE — 3008F BODY MASS INDEX DOCD: CPT | Mod: CPTII,S$GLB,, | Performed by: INTERNAL MEDICINE

## 2023-03-23 PROCEDURE — 1126F AMNT PAIN NOTED NONE PRSNT: CPT | Mod: CPTII,S$GLB,, | Performed by: INTERNAL MEDICINE

## 2023-03-23 NOTE — PROGRESS NOTES
Subjective:    Patient ID:  Malcom Ho is a 67 y.o. male patient here for evaluation Follow-up      History of Present Illness:   Cardiology follow-up left heart catheterization mid segment LAD stenosis indeterminate 50-70% by visual, normal IFR FFR.  Patient doing well with medical therapy.  No definite angina.  No major change in TORRES.  Cholesterol medications increased with improved LDL cholesterol levels.    No PND orthopnea.  No arrhythmia.  No myalgia.  Some difficulty with sleep at nighttime due to increase cholesterol medication.          Review of patient's allergies indicates:  No Known Allergies    Past Medical History:   Diagnosis Date    Arrhythmia     GERD (gastroesophageal reflux disease)     Glaucoma     Hypertension     Osteoarthritis of both hands     Rupture of left long head biceps tendon 05/2019    SOB (shortness of breath)     Urinary frequency      Past Surgical History:   Procedure Laterality Date    ANGIOGRAM, CORONARY, WITH LEFT HEART CATHETERIZATION N/A 2/27/2023    Procedure: Left Heart Cath;  Surgeon: Jimbo Garza MD;  Location: [facility] CATH;  Service: Cardiology;  Laterality: N/A;    COLONOSCOPY  2006    COLONOSCOPY N/A 11/07/2022    Procedure: COLONOSCOPY;  Surgeon: Cameron Bradshaw Jr., MD;  Location: [facility] ENDO;  Service: Endoscopy;  Laterality: N/A;    FRACTIONAL FLOW RESERVE MEASUREMENT, MYOCARDIAL  2/27/2023    Procedure: Fractional Flow Reserve;  Surgeon: Jimbo Garza MD;  Location: [facility] CATH;  Service: Cardiology;;    KNEE SURGERY Right     SINUS SURGERY      UVULOPALATOPHARYNGOPLASTY       Social History     Tobacco Use    Smoking status: Never     Passive exposure: Never    Smokeless tobacco: Never   Substance Use Topics    Alcohol use: Yes     Alcohol/week: 5.0 standard drinks     Types: 3 Glasses of wine, 2 Shots of liquor per week    Drug use: No        Review of Systems:    As noted in HPI in addition      REVIEW OF SYSTEMS  Review of Systems   Constitutional: Negative for  decreased appetite, diaphoresis, night sweats, weight gain and weight loss.   HENT:  Negative for nosebleeds and odynophagia.    Eyes:  Negative for double vision and photophobia.   Cardiovascular:  Negative for chest pain, claudication, cyanosis, dyspnea on exertion, irregular heartbeat, leg swelling, near-syncope, orthopnea, palpitations, paroxysmal nocturnal dyspnea and syncope.   Respiratory:  Negative for cough, hemoptysis, shortness of breath and wheezing.    Hematologic/Lymphatic: Negative for adenopathy.   Skin:  Negative for flushing, skin cancer and suspicious lesions.   Musculoskeletal:  Negative for gout, myalgias and neck pain.   Gastrointestinal:  Negative for abdominal pain, heartburn, hematemesis and hematochezia.   Genitourinary:  Negative for bladder incontinence, hesitancy and nocturia.   Neurological:  Negative for focal weakness, headaches, light-headedness and paresthesias.   Psychiatric/Behavioral:  Negative for memory loss and substance abuse.             Objective:        Vitals:    03/23/23 1255   BP: 124/76   Pulse: 63       Lab Results   Component Value Date    WBC 4.19 03/17/2023    HGB 14.4 03/17/2023    HCT 45.1 03/17/2023     03/17/2023    CHOL 142 03/17/2023    TRIG 56 03/17/2023    HDL 52 03/17/2023    ALT 39 03/17/2023    AST 36 03/17/2023     03/17/2023    K 4.3 03/17/2023     03/17/2023    CREATININE 0.9 03/17/2023    BUN 25 (H) 03/17/2023    CO2 28 03/17/2023    TSH 1.430 02/02/2019    PSA 1.0 05/13/2020        ECHOCARDIOGRAM RESULTS  Results for orders placed during the hospital encounter of 03/09/21    Echo Color Flow Doppler? Yes    Interpretation Summary  · Normal systolic function. The estimated ejection fraction is 60%  · Grade I left ventricular diastolic dysfunction.  · Normal right ventricular size with normal right ventricular systolic function.  · Mild left atrial enlargement.        CURRENT/PREVIOUS VISIT EKG  Results for orders placed or performed  during the hospital encounter of 02/27/23   EKG 12-lead    Collection Time: 02/27/23  9:06 AM    Narrative    Test Reason : R07.9,R93.1,R06.09,    Vent. Rate : 066 BPM     Atrial Rate : 066 BPM     P-R Int : 166 ms          QRS Dur : 088 ms      QT Int : 390 ms       P-R-T Axes : 036 -13 -05 degrees     QTc Int : 408 ms    Normal sinus rhythm  Normal ECG  No previous ECGs available  Confirmed by Jimbo Garza MD ([id number]) on 2/27/2023 12:41:15 PM    Referred By: Jimbo Garza MD           Confirmed By:Jimbo Garza MD     No valid procedures specified.   No results found for this or any previous visit.    No valid procedures specified.    PHYSICAL EXAM  CONSTITUTIONAL: Well built, well nourished in no apparent distress  NECK: no carotid bruit, no JVD  LUNGS: CTA  CHEST WALL: no tenderness,  HEART: regular rate and rhythm, S1, S2 normal, no murmur, click, rub or gallop   ABDOMEN: soft, non-tender; bowel sounds normal; no masses,  no organomegaly  EXTREMITIES: Extremities normal, no edema, no calf tenderness noted  NEURO: AAO X 3    I HAVE REVIEWED :    The vital signs, nurses notes, and all the pertinent radiology and labs.         Current Outpatient Medications   Medication Instructions    amLODIPine (NORVASC) 5 mg, Oral, Daily    aspirin (ECOTRIN) 81 mg, Oral, Daily    atorvastatin (LIPITOR) 80 mg, Oral, Daily    EPINEPHrine (EPIPEN) 0.3 mg/0.3 mL AtIn No dose, route, or frequency recorded.    fluticasone propionate (FLONASE) 50 mcg, Each Nostril, 2 times daily    latanoprost 0.005 % ophthalmic solution 1 drop, Both Eyes, Nightly    losartan-hydrochlorothiazide 100-25 mg (HYZAAR) 100-25 mg per tablet TAKE 1 TABLET BY MOUTH EVERY DAY    pantoprazole (PROTONIX) 40 mg, Oral, Daily          Assessment:   CAD, moderate LAD stenosis, medical management now.  Unremarkable FFR/IFR.  EF normal.  Hypertension, dyslipidemia.  Family history.        Plan:   Four months with continued monitoring of lipids.  Call if worsening side effects from  increase dose of Lipitor, not taking 80 mg daily.          No follow-ups on file.

## 2023-05-12 ENCOUNTER — OFFICE VISIT (OUTPATIENT)
Dept: URGENT CARE | Facility: CLINIC | Age: 68
End: 2023-05-12
Payer: MEDICARE

## 2023-05-12 VITALS
BODY MASS INDEX: 26.01 KG/M2 | OXYGEN SATURATION: 97 % | HEART RATE: 68 BPM | DIASTOLIC BLOOD PRESSURE: 81 MMHG | SYSTOLIC BLOOD PRESSURE: 143 MMHG | WEIGHT: 192 LBS | TEMPERATURE: 98 F | HEIGHT: 72 IN

## 2023-05-12 DIAGNOSIS — J06.9 VIRAL URI: Primary | ICD-10-CM

## 2023-05-12 DIAGNOSIS — J02.9 SORE THROAT: ICD-10-CM

## 2023-05-12 LAB
CTP QC/QA: YES
MOLECULAR STREP A: NEGATIVE

## 2023-05-12 PROCEDURE — 87651 STREP A DNA AMP PROBE: CPT | Mod: QW,S$GLB,, | Performed by: NURSE PRACTITIONER

## 2023-05-12 PROCEDURE — 87651 POCT STREP A MOLECULAR: ICD-10-PCS | Mod: QW,S$GLB,, | Performed by: NURSE PRACTITIONER

## 2023-05-12 PROCEDURE — 99213 OFFICE O/P EST LOW 20 MIN: CPT | Mod: S$GLB,,, | Performed by: NURSE PRACTITIONER

## 2023-05-12 PROCEDURE — 99213 PR OFFICE/OUTPT VISIT, EST, LEVL III, 20-29 MIN: ICD-10-PCS | Mod: S$GLB,,, | Performed by: NURSE PRACTITIONER

## 2023-05-12 RX ORDER — IPRATROPIUM BROMIDE 21 UG/1
1 SPRAY, METERED NASAL 2 TIMES DAILY
Qty: 30 ML | Refills: 0 | Status: SHIPPED | OUTPATIENT
Start: 2023-05-12 | End: 2023-05-19

## 2023-05-12 NOTE — PROGRESS NOTES
Subjective:      Patient ID: Malcom Ho is a 67 y.o. male.    Vitals:  height is 6' (1.829 m) and weight is 87.1 kg (192 lb). His temperature is 97.7 °F (36.5 °C). His blood pressure is 143/81 (abnormal) and his pulse is 68. His oxygen saturation is 97%.     Chief Complaint: Sore Throat    Sinus congestion, post nasal drip, sore throat and cough that started 2-3 days ago  Exposed to strep at home from granddaughter and his spouse has had a likely viral illness.  Patient has been taking Nyquil with slight relief.     Some discoloration to sputum.    Other  This is a new problem. The current episode started in the past 7 days. The problem occurs constantly. The problem has been gradually worsening. Associated symptoms include congestion, coughing and a sore throat. Treatments tried: Nyquil. The treatment provided mild relief.     HENT:  Positive for congestion and sore throat.    Respiratory:  Positive for cough.     Objective:     Physical Exam   Constitutional: He is oriented to person, place, and time. He appears well-developed. He is cooperative.  Non-toxic appearance. He does not appear ill. No distress.   HENT:   Head: Normocephalic and atraumatic.   Ears:   Right Ear: Hearing, tympanic membrane, external ear and ear canal normal.   Left Ear: Hearing, tympanic membrane, external ear and ear canal normal.   Nose: Mucosal edema and rhinorrhea present. No nasal deformity. No epistaxis. Right sinus exhibits no maxillary sinus tenderness and no frontal sinus tenderness. Left sinus exhibits no maxillary sinus tenderness and no frontal sinus tenderness.   Mouth/Throat: Uvula is midline, oropharynx is clear and moist and mucous membranes are normal. No trismus in the jaw. Normal dentition. No uvula swelling. Cobblestoning present. No oropharyngeal exudate, posterior oropharyngeal edema or posterior oropharyngeal erythema.   Eyes: Conjunctivae and lids are normal. No scleral icterus.   Neck: Trachea normal and  phonation normal. Neck supple. No edema present. No erythema present. No neck rigidity present.   Cardiovascular: Normal rate, regular rhythm, normal heart sounds and normal pulses.   Pulmonary/Chest: Effort normal and breath sounds normal. No stridor. No respiratory distress. He has no decreased breath sounds. He has no wheezes. He has no rhonchi. He has no rales.   Abdominal: Normal appearance.   Musculoskeletal: Normal range of motion.         General: No deformity. Normal range of motion.   Neurological: He is alert and oriented to person, place, and time. He exhibits normal muscle tone. Coordination normal.   Skin: Skin is warm, dry, intact, not diaphoretic and not pale.   Psychiatric: His speech is normal and behavior is normal. Judgment and thought content normal.   Nursing note and vitals reviewed.    Results for orders placed or performed in visit on 05/12/23   POCT Strep A, Molecular   Result Value Ref Range    Molecular Strep A, POC Negative Negative     Acceptable Yes        Assessment:     1. Viral URI    2. Sore throat        Plan:     Labs ordered at this visit reviewed.       Viral URI  -     ipratropium (ATROVENT) 21 mcg (0.03 %) nasal spray; 1 spray by Each Nostril route 2 (two) times daily. for 7 days  Dispense: 30 mL; Refill: 0    Sore throat  -     POCT Strep A, Molecular

## 2023-09-18 ENCOUNTER — PATIENT MESSAGE (OUTPATIENT)
Dept: ADMINISTRATIVE | Facility: OTHER | Age: 68
End: 2023-09-18
Payer: MEDICARE

## 2023-10-04 ENCOUNTER — OFFICE VISIT (OUTPATIENT)
Dept: CARDIOLOGY | Facility: CLINIC | Age: 68
End: 2023-10-04
Payer: MEDICARE

## 2023-10-04 VITALS
HEART RATE: 58 BPM | BODY MASS INDEX: 25.83 KG/M2 | HEIGHT: 72 IN | WEIGHT: 190.69 LBS | SYSTOLIC BLOOD PRESSURE: 153 MMHG | DIASTOLIC BLOOD PRESSURE: 74 MMHG

## 2023-10-04 DIAGNOSIS — I20.89 OTHER FORMS OF ANGINA PECTORIS: ICD-10-CM

## 2023-10-04 DIAGNOSIS — I10 PRIMARY HYPERTENSION: Primary | ICD-10-CM

## 2023-10-04 PROCEDURE — 1159F PR MEDICATION LIST DOCUMENTED IN MEDICAL RECORD: ICD-10-PCS | Mod: CPTII,S$GLB,, | Performed by: INTERNAL MEDICINE

## 2023-10-04 PROCEDURE — 1126F AMNT PAIN NOTED NONE PRSNT: CPT | Mod: CPTII,S$GLB,, | Performed by: INTERNAL MEDICINE

## 2023-10-04 PROCEDURE — 1126F PR PAIN SEVERITY QUANTIFIED, NO PAIN PRESENT: ICD-10-PCS | Mod: CPTII,S$GLB,, | Performed by: INTERNAL MEDICINE

## 2023-10-04 PROCEDURE — 3078F DIAST BP <80 MM HG: CPT | Mod: CPTII,S$GLB,, | Performed by: INTERNAL MEDICINE

## 2023-10-04 PROCEDURE — 1160F RVW MEDS BY RX/DR IN RCRD: CPT | Mod: CPTII,S$GLB,, | Performed by: INTERNAL MEDICINE

## 2023-10-04 PROCEDURE — 3288F PR FALLS RISK ASSESSMENT DOCUMENTED: ICD-10-PCS | Mod: CPTII,S$GLB,, | Performed by: INTERNAL MEDICINE

## 2023-10-04 PROCEDURE — 1159F MED LIST DOCD IN RCRD: CPT | Mod: CPTII,S$GLB,, | Performed by: INTERNAL MEDICINE

## 2023-10-04 PROCEDURE — 3078F PR MOST RECENT DIASTOLIC BLOOD PRESSURE < 80 MM HG: ICD-10-PCS | Mod: CPTII,S$GLB,, | Performed by: INTERNAL MEDICINE

## 2023-10-04 PROCEDURE — 3008F PR BODY MASS INDEX (BMI) DOCUMENTED: ICD-10-PCS | Mod: CPTII,S$GLB,, | Performed by: INTERNAL MEDICINE

## 2023-10-04 PROCEDURE — 1160F PR REVIEW ALL MEDS BY PRESCRIBER/CLIN PHARMACIST DOCUMENTED: ICD-10-PCS | Mod: CPTII,S$GLB,, | Performed by: INTERNAL MEDICINE

## 2023-10-04 PROCEDURE — 99214 OFFICE O/P EST MOD 30 MIN: CPT | Mod: S$GLB,,, | Performed by: INTERNAL MEDICINE

## 2023-10-04 PROCEDURE — 99999 PR PBB SHADOW E&M-EST. PATIENT-LVL III: CPT | Mod: PBBFAC,,, | Performed by: INTERNAL MEDICINE

## 2023-10-04 PROCEDURE — 3288F FALL RISK ASSESSMENT DOCD: CPT | Mod: CPTII,S$GLB,, | Performed by: INTERNAL MEDICINE

## 2023-10-04 PROCEDURE — 1101F PT FALLS ASSESS-DOCD LE1/YR: CPT | Mod: CPTII,S$GLB,, | Performed by: INTERNAL MEDICINE

## 2023-10-04 PROCEDURE — 99999 PR PBB SHADOW E&M-EST. PATIENT-LVL III: ICD-10-PCS | Mod: PBBFAC,,, | Performed by: INTERNAL MEDICINE

## 2023-10-04 PROCEDURE — 3077F SYST BP >= 140 MM HG: CPT | Mod: CPTII,S$GLB,, | Performed by: INTERNAL MEDICINE

## 2023-10-04 PROCEDURE — 3008F BODY MASS INDEX DOCD: CPT | Mod: CPTII,S$GLB,, | Performed by: INTERNAL MEDICINE

## 2023-10-04 PROCEDURE — 3077F PR MOST RECENT SYSTOLIC BLOOD PRESSURE >= 140 MM HG: ICD-10-PCS | Mod: CPTII,S$GLB,, | Performed by: INTERNAL MEDICINE

## 2023-10-04 PROCEDURE — 99214 PR OFFICE/OUTPT VISIT, EST, LEVL IV, 30-39 MIN: ICD-10-PCS | Mod: S$GLB,,, | Performed by: INTERNAL MEDICINE

## 2023-10-04 PROCEDURE — 1101F PR PT FALLS ASSESS DOC 0-1 FALLS W/OUT INJ PAST YR: ICD-10-PCS | Mod: CPTII,S$GLB,, | Performed by: INTERNAL MEDICINE

## 2023-10-04 NOTE — PROGRESS NOTES
Subjective:    Patient ID:  Malcom Ho is a 67 y.o. male patient here for evaluation Follow-up      History of Present Illness:   Cardiology follow-up.  Coronary disease.  Left heart catheterization done year ago with indeterminate mid LAD stenosis IFR/FFR.  Overall asymptomatic.  Cholesterol levels at goal with aggressive statin therapy.    No angina no dyspnea.  No arrhythmia.  Exercises well complaints.          Review of patient's allergies indicates:  No Known Allergies    Past Medical History:   Diagnosis Date    Arrhythmia     GERD (gastroesophageal reflux disease)     Glaucoma     Hypertension     Osteoarthritis of both hands     Rupture of left long head biceps tendon 05/2019    SOB (shortness of breath)     Urinary frequency      Past Surgical History:   Procedure Laterality Date    ANGIOGRAM, CORONARY, WITH LEFT HEART CATHETERIZATION N/A 2/27/2023    Procedure: Left Heart Cath;  Surgeon: Jimbo Garza MD;  Location: [facility] CATH;  Service: Cardiology;  Laterality: N/A;    COLONOSCOPY  2006    COLONOSCOPY N/A 11/07/2022    Procedure: COLONOSCOPY;  Surgeon: Cameron Bradshaw Jr., MD;  Location: [facility] ENDO;  Service: Endoscopy;  Laterality: N/A;    FRACTIONAL FLOW RESERVE MEASUREMENT, MYOCARDIAL  2/27/2023    Procedure: Fractional Flow Reserve;  Surgeon: Jimbo Garza MD;  Location: [facility] CATH;  Service: Cardiology;;    KNEE SURGERY Right     SINUS SURGERY      UVULOPALATOPHARYNGOPLASTY       Social History     Tobacco Use    Smoking status: Never     Passive exposure: Never    Smokeless tobacco: Never   Substance Use Topics    Alcohol use: Yes     Alcohol/week: 5.0 standard drinks of alcohol     Types: 3 Glasses of wine, 2 Shots of liquor per week    Drug use: No        Review of Systems:    As noted in HPI in addition      REVIEW OF SYSTEMS  ROS           Objective:        Vitals:    10/04/23 1354   BP: (!) 153/74   Pulse: (!) 58       Lab Results   Component Value Date    WBC 4.21 07/11/2023    HGB 13.5 (L)  07/11/2023    HCT 41.9 07/11/2023     07/11/2023    CHOL 153 07/11/2023    TRIG 45 07/11/2023    HDL 82 (H) 07/11/2023    ALT 44 07/11/2023    AST 48 07/11/2023     07/11/2023    K 3.9 07/11/2023     07/11/2023    CREATININE 0.78 07/11/2023    BUN 19 07/11/2023    CO2 24 07/11/2023    TSH 1.430 02/02/2019    PSA 1.0 05/13/2020        ECHOCARDIOGRAM RESULTS  Results for orders placed during the hospital encounter of 03/09/21    Echo Color Flow Doppler? Yes    Interpretation Summary  · Normal systolic function. The estimated ejection fraction is 60%  · Grade I left ventricular diastolic dysfunction.  · Normal right ventricular size with normal right ventricular systolic function.  · Mild left atrial enlargement.        CURRENT/PREVIOUS VISIT EKG  Results for orders placed or performed during the hospital encounter of 02/27/23   EKG 12-lead    Collection Time: 02/27/23  9:06 AM    Narrative    Test Reason : R07.9,R93.1,R06.09,    Vent. Rate : 066 BPM     Atrial Rate : 066 BPM     P-R Int : 166 ms          QRS Dur : 088 ms      QT Int : 390 ms       P-R-T Axes : 036 -13 -05 degrees     QTc Int : 408 ms    Normal sinus rhythm  Normal ECG  No previous ECGs available  Confirmed by Jimbo Garza MD ([id number]) on 2/27/2023 12:41:15 PM    Referred By: Jimbo Garza MD           Confirmed By:Jimbo Garza MD     No valid procedures specified.   No results found for this or any previous visit.    No valid procedures specified.    PHYSICAL EXAM  CONSTITUTIONAL: Well built, well nourished in no apparent distress  NECK: no carotid bruit, no JVD  LUNGS: CTA  CHEST WALL: no tenderness,  HEART: regular rate and rhythm, S1, S2 normal, no murmur, click, rub or gallop   ABDOMEN: soft, non-tender; bowel sounds normal; no masses,  no organomegaly  EXTREMITIES: Extremities normal, no edema, no calf tenderness noted  NEURO: AAO X 3    I HAVE REVIEWED :    The vital signs, nurses notes, and all the pertinent radiology and labs.          Current Outpatient Medications   Medication Instructions    amLODIPine (NORVASC) 5 mg, Oral, Daily    aspirin (ECOTRIN) 81 mg, Oral, Daily    atorvastatin (LIPITOR) 80 mg, Oral, Daily    EPINEPHrine (EPIPEN) 0.3 mg/0.3 mL AtIn No dose, route, or frequency recorded.    latanoprost 0.005 % ophthalmic solution 1 drop, Both Eyes, Nightly    losartan-hydrochlorothiazide 100-25 mg (HYZAAR) 100-25 mg per tablet 1 tablet, Oral, Daily    meloxicam (MOBIC) 15 mg, Oral, Daily    pantoprazole (PROTONIX) 40 MG tablet TAKE 1 TABLET(40 MG) BY MOUTH EVERY DAY          Assessment:   CAD, moderate LAD stenosis with unremarkable IFR/FFR.  EF normal.  Hypertension dyslipidemia.  Lipids at goal.        Plan:   Cardiac exam review of systems stable.  Meds reviewed and reconciled.  Recommend no changes.  Labs follow-up primary care.  Six-month follow-up cardiology.          No follow-ups on file.

## 2023-12-23 ENCOUNTER — PATIENT MESSAGE (OUTPATIENT)
Dept: ADMINISTRATIVE | Facility: OTHER | Age: 68
End: 2023-12-23
Payer: MEDICARE

## 2024-04-05 ENCOUNTER — OFFICE VISIT (OUTPATIENT)
Dept: CARDIOLOGY | Facility: CLINIC | Age: 69
End: 2024-04-05
Payer: MEDICARE

## 2024-04-05 VITALS
WEIGHT: 189.81 LBS | BODY MASS INDEX: 25.71 KG/M2 | DIASTOLIC BLOOD PRESSURE: 75 MMHG | HEART RATE: 58 BPM | HEIGHT: 72 IN | SYSTOLIC BLOOD PRESSURE: 145 MMHG

## 2024-04-05 DIAGNOSIS — I25.119 ATHEROSCLEROSIS OF NATIVE CORONARY ARTERY OF NATIVE HEART WITH ANGINA PECTORIS: Primary | ICD-10-CM

## 2024-04-05 DIAGNOSIS — I10 PRIMARY HYPERTENSION: ICD-10-CM

## 2024-04-05 DIAGNOSIS — I20.89 OTHER FORMS OF ANGINA PECTORIS: ICD-10-CM

## 2024-04-05 DIAGNOSIS — S46.212A STRAIN OF BICEPS MUSCLE, LEFT, INITIAL ENCOUNTER: ICD-10-CM

## 2024-04-05 PROCEDURE — 99214 OFFICE O/P EST MOD 30 MIN: CPT | Mod: S$GLB,,, | Performed by: INTERNAL MEDICINE

## 2024-04-05 PROCEDURE — 1159F MED LIST DOCD IN RCRD: CPT | Mod: CPTII,S$GLB,, | Performed by: INTERNAL MEDICINE

## 2024-04-05 PROCEDURE — 3077F SYST BP >= 140 MM HG: CPT | Mod: CPTII,S$GLB,, | Performed by: INTERNAL MEDICINE

## 2024-04-05 PROCEDURE — 3008F BODY MASS INDEX DOCD: CPT | Mod: CPTII,S$GLB,, | Performed by: INTERNAL MEDICINE

## 2024-04-05 PROCEDURE — 3078F DIAST BP <80 MM HG: CPT | Mod: CPTII,S$GLB,, | Performed by: INTERNAL MEDICINE

## 2024-04-05 PROCEDURE — 1101F PT FALLS ASSESS-DOCD LE1/YR: CPT | Mod: CPTII,S$GLB,, | Performed by: INTERNAL MEDICINE

## 2024-04-05 PROCEDURE — 3288F FALL RISK ASSESSMENT DOCD: CPT | Mod: CPTII,S$GLB,, | Performed by: INTERNAL MEDICINE

## 2024-04-05 PROCEDURE — 99999 PR PBB SHADOW E&M-EST. PATIENT-LVL III: CPT | Mod: PBBFAC,,, | Performed by: INTERNAL MEDICINE

## 2024-04-05 PROCEDURE — 1160F RVW MEDS BY RX/DR IN RCRD: CPT | Mod: CPTII,S$GLB,, | Performed by: INTERNAL MEDICINE

## 2024-04-05 PROCEDURE — 1126F AMNT PAIN NOTED NONE PRSNT: CPT | Mod: CPTII,S$GLB,, | Performed by: INTERNAL MEDICINE

## 2024-04-05 NOTE — PROGRESS NOTES
Subjective:    Patient ID:  Malcom Ho is a 68 y.o. male patient here for evaluation Follow-up      History of Present Illness:  Follow-up.  Hypertension, dyslipidemia.  History of coronary disease.  Left heart catheterization done year ago with moderate mid LAD stenosis, normal IFR/FFR.    No angina, no dyspnea.  No PND orthopnea.  Occasional dependent edema.  Good exercise tolerance.             Review of patient's allergies indicates:  No Known Allergies    Past Medical History:   Diagnosis Date    Arrhythmia     GERD (gastroesophageal reflux disease)     Glaucoma     Hypertension     Osteoarthritis of both hands     Rupture of left long head biceps tendon 05/2019    SOB (shortness of breath)     Urinary frequency      Past Surgical History:   Procedure Laterality Date    ANGIOGRAM, CORONARY, WITH LEFT HEART CATHETERIZATION N/A 2/27/2023    Procedure: Left Heart Cath;  Surgeon: Jimbo Garza MD;  Location: [facility] CATH;  Service: Cardiology;  Laterality: N/A;    COLONOSCOPY  2006    COLONOSCOPY N/A 11/07/2022    Procedure: COLONOSCOPY;  Surgeon: Cameron Bradshaw Jr., MD;  Location: [facility] ENDO;  Service: Endoscopy;  Laterality: N/A;    FRACTIONAL FLOW RESERVE MEASUREMENT, MYOCARDIAL  2/27/2023    Procedure: Fractional Flow Reserve;  Surgeon: Jimbo Garza MD;  Location: [facility] CATH;  Service: Cardiology;;    KNEE SURGERY Right     SINUS SURGERY      UVULOPALATOPHARYNGOPLASTY       Social History     Tobacco Use    Smoking status: Never     Passive exposure: Never    Smokeless tobacco: Never   Substance Use Topics    Alcohol use: Yes     Alcohol/week: 5.0 standard drinks of alcohol     Types: 3 Glasses of wine, 2 Shots of liquor per week    Drug use: No        Review of Systems:    As noted in HPI in addition      REVIEW OF SYSTEMS  Review of Systems   Constitutional: Negative for decreased appetite, diaphoresis, night sweats, weight gain and weight loss.   HENT:  Negative for nosebleeds and odynophagia.    Eyes:   Negative for double vision and photophobia.   Cardiovascular:  Negative for chest pain, claudication, cyanosis, dyspnea on exertion, irregular heartbeat, leg swelling, near-syncope, orthopnea, palpitations, paroxysmal nocturnal dyspnea and syncope.   Respiratory:  Negative for cough, hemoptysis, shortness of breath and wheezing.    Hematologic/Lymphatic: Negative for adenopathy.   Skin:  Negative for flushing, skin cancer and suspicious lesions.   Musculoskeletal:  Negative for gout, myalgias and neck pain.   Gastrointestinal:  Negative for abdominal pain, heartburn, hematemesis and hematochezia.   Genitourinary:  Negative for bladder incontinence, hesitancy and nocturia.   Neurological:  Negative for focal weakness, headaches, light-headedness and paresthesias.   Psychiatric/Behavioral:  Negative for memory loss and substance abuse.               Objective:        Vitals:    04/05/24 0922   BP: (!) 145/75   Pulse: (!) 58       Lab Results   Component Value Date    WBC 4.80 11/03/2023    HGB 14.2 11/03/2023    HCT 44.2 11/03/2023     11/03/2023    CHOL 161 11/03/2023    TRIG 48 11/03/2023    HDL 85 (H) 11/03/2023    ALT 47 11/03/2023    AST 47 11/03/2023     11/03/2023    K 4.0 11/03/2023     11/03/2023    CREATININE 0.92 11/03/2023    BUN 27 (H) 11/03/2023    CO2 30 11/03/2023    TSH 1.430 02/02/2019    PSA 1.1 11/03/2023        ECHOCARDIOGRAM RESULTS  Results for orders placed during the hospital encounter of 03/09/21    Echo Color Flow Doppler? Yes    Interpretation Summary  · Normal systolic function. The estimated ejection fraction is 60%  · Grade I left ventricular diastolic dysfunction.  · Normal right ventricular size with normal right ventricular systolic function.  · Mild left atrial enlargement.    Results for orders placed during the hospital encounter of 02/27/23    Cardiac catheterization    Conclusion  Procedures:  Moderate sedation  Left heart cath  Coronary angiogram  iFR and FFR  of the LAD    Conclusions:  70% focal eccentric prox LAD stenosis, not hemodynamically significant by iFR or FFR  Otherwise nonobstructive CAD  Normal left filling pressure    Recommendations:  Aspirin 81 mg daily  High intensity statijn  Prox LAD is amenable to PCI technically if thought to contribute to medically refractory angina. Discussed with Dr. Esparza.          CURRENT/PREVIOUS VISIT EKG  Results for orders placed or performed during the hospital encounter of 02/27/23   EKG 12-lead    Collection Time: 02/27/23  9:06 AM    Narrative    Test Reason : R07.9,R93.1,R06.09,    Vent. Rate : 066 BPM     Atrial Rate : 066 BPM     P-R Int : 166 ms          QRS Dur : 088 ms      QT Int : 390 ms       P-R-T Axes : 036 -13 -05 degrees     QTc Int : 408 ms    Normal sinus rhythm  Normal ECG  No previous ECGs available  Confirmed by Jimbo Graza MD ([id number]) on 2/27/2023 12:41:15 PM    Referred By: Jimbo Garza MD           Confirmed By:Jimbo Garza MD     No valid procedures specified.   No results found for this or any previous visit.    No valid procedures specified.    PHYSICAL EXAM  GENERAL: well built, well nourished, well-developed in no apparent distress alert and oriented.   HEENT: Normocephalic. Pupils normal and conjunctivae normal.  Mucous membranes normal, no cyanosis or icterus, trachea central,no pallor or icterus is noted..   NECK: No JVD. No bruit..   THYROID: Thyroid not enlarged. No nodules present..   CARDIAC:  Normal S1-S2.  No murmur rub click or gallop.  PMI nondisplaced.  CHEST ANATOMY: normal.   LUNGS: Clear to auscultation. No wheezing or rhonchi..   ABDOMEN: Soft no masses or organomegaly.  No abdomen pulsations or bruits.  Normal bowel sounds. No pulsations and no masses felt, No guarding or rebound.   URINARY: No santoyo catheter   EXTREMITIES: No cyanosis, clubbing or edema noted at this time., no calf tenderness bilaterally.   PERIPHERAL VASCULAR SYSTEM: Good palpable distal pulses.  2+ femoral, popliteal  and pedal pulses.  No bruits    CENTRAL NERVOUS SYSTEM: No focal motor or sensory deficits noted.   SKIN: Skin without lesions, moist, well perfused.   MUSCLE STRENGTH & TONE: No noteable weakness, atrophy or abnormal movement    I HAVE REVIEWED :    The vital signs, nurses notes, and all the pertinent radiology and labs.         Current Outpatient Medications   Medication Instructions    amLODIPine (NORVASC) 5 mg, Oral, Daily    aspirin (ECOTRIN) 81 mg, Oral, Daily    atorvastatin (LIPITOR) 80 mg, Oral, Daily    EPINEPHrine (EPIPEN) 0.3 mg/0.3 mL AtIn No dose, route, or frequency recorded.    latanoprost 0.005 % ophthalmic solution 1 drop, Both Eyes, Nightly    losartan-hydrochlorothiazide 100-25 mg (HYZAAR) 100-25 mg per tablet 1 tablet, Oral, Daily    meloxicam (MOBIC) 15 mg, Oral, Daily    pantoprazole (PROTONIX) 40 mg, Oral, Daily          Assessment:   Coronary disease.  Moderate LAD stenosis, left heart catheterization liver year ago with normal IFR/FFR.    Hypertension  Dyslipidemia        Plan:     Exam review of systems stable.  Meds reviewed and reconciled.  Recommend changes.  Six-month.  Continue risk factor modification.        No follow-ups on file.

## 2024-04-30 ENCOUNTER — TELEPHONE (OUTPATIENT)
Dept: PODIATRY | Facility: CLINIC | Age: 69
End: 2024-04-30
Payer: MEDICARE

## 2024-05-01 ENCOUNTER — OFFICE VISIT (OUTPATIENT)
Dept: PODIATRY | Facility: CLINIC | Age: 69
End: 2024-05-01
Payer: MEDICARE

## 2024-05-01 ENCOUNTER — TELEPHONE (OUTPATIENT)
Dept: PODIATRY | Facility: CLINIC | Age: 69
End: 2024-05-01

## 2024-05-01 VITALS
DIASTOLIC BLOOD PRESSURE: 67 MMHG | WEIGHT: 188.94 LBS | SYSTOLIC BLOOD PRESSURE: 122 MMHG | HEART RATE: 67 BPM | BODY MASS INDEX: 25.59 KG/M2 | HEIGHT: 72 IN

## 2024-05-01 DIAGNOSIS — L60.9 DISEASE OF NAIL: Primary | ICD-10-CM

## 2024-05-01 DIAGNOSIS — M79.675 TOE PAIN, BILATERAL: ICD-10-CM

## 2024-05-01 DIAGNOSIS — M79.674 TOE PAIN, BILATERAL: ICD-10-CM

## 2024-05-01 PROCEDURE — 1160F RVW MEDS BY RX/DR IN RCRD: CPT | Mod: CPTII,S$GLB,, | Performed by: PODIATRIST

## 2024-05-01 PROCEDURE — 99204 OFFICE O/P NEW MOD 45 MIN: CPT | Mod: 25,S$GLB,, | Performed by: PODIATRIST

## 2024-05-01 PROCEDURE — 3078F DIAST BP <80 MM HG: CPT | Mod: CPTII,S$GLB,, | Performed by: PODIATRIST

## 2024-05-01 PROCEDURE — 99999 PR PBB SHADOW E&M-EST. PATIENT-LVL III: CPT | Mod: PBBFAC,,, | Performed by: PODIATRIST

## 2024-05-01 PROCEDURE — 11732 AVLSN NAIL PLATE SIMPLE EACH: CPT | Mod: T5,S$GLB,, | Performed by: PODIATRIST

## 2024-05-01 PROCEDURE — 1125F AMNT PAIN NOTED PAIN PRSNT: CPT | Mod: CPTII,S$GLB,, | Performed by: PODIATRIST

## 2024-05-01 PROCEDURE — 3008F BODY MASS INDEX DOCD: CPT | Mod: CPTII,S$GLB,, | Performed by: PODIATRIST

## 2024-05-01 PROCEDURE — 1159F MED LIST DOCD IN RCRD: CPT | Mod: CPTII,S$GLB,, | Performed by: PODIATRIST

## 2024-05-01 PROCEDURE — 11730 AVULSION NAIL PLATE SIMPLE 1: CPT | Mod: TA,S$GLB,, | Performed by: PODIATRIST

## 2024-05-01 PROCEDURE — 3074F SYST BP LT 130 MM HG: CPT | Mod: CPTII,S$GLB,, | Performed by: PODIATRIST

## 2024-05-01 NOTE — PROGRESS NOTES
Subjective:      Patient ID: Malcom Ho is a 68 y.o. male.    Chief Complaint:   Nail Care    Malcom is a 68 y.o. male who presents to the clinic complaining of painful bleeding toenail on both feet.     Patient relates in January he saw his dermatologist and was prescribed a prescription to use  He use this prescription for about 6 weeks it was from a [facility] [place]  Consisting of ciclopirox/icontracoze/terbinafine/fluconazole    Started burning his skin in his nails were cracking and bleeding about a week ago  He stopped using it    Relates a nails sample was taken at the dermatologist    Past Medical History:   Diagnosis Date    Arrhythmia     GERD (gastroesophageal reflux disease)     Glaucoma     Hypertension     Osteoarthritis of both hands     Rupture of left long head biceps tendon 05/2019    SOB (shortness of breath)     Urinary frequency      Past Surgical History:   Procedure Laterality Date    ANGIOGRAM, CORONARY, WITH LEFT HEART CATHETERIZATION N/A 2/27/2023    Procedure: Left Heart Cath;  Surgeon: Jimbo Garza MD;  Location: [facility] CATH;  Service: Cardiology;  Laterality: N/A;    COLONOSCOPY  2006    COLONOSCOPY N/A 11/07/2022    Procedure: COLONOSCOPY;  Surgeon: Cameron Bradshaw Jr., MD;  Location: [facility] ENDO;  Service: Endoscopy;  Laterality: N/A;    FRACTIONAL FLOW RESERVE MEASUREMENT, MYOCARDIAL  2/27/2023    Procedure: Fractional Flow Reserve;  Surgeon: Jimbo Garza MD;  Location: [facility] CATH;  Service: Cardiology;;    KNEE SURGERY Right     SINUS SURGERY      UVULOPALATOPHARYNGOPLASTY       Current Outpatient Medications on File Prior to Visit   Medication Sig Dispense Refill    amLODIPine (NORVASC) 5 MG tablet Take 1 tablet (5 mg total) by mouth once daily. 90 tablet 1    aspirin (ECOTRIN) 81 MG EC tablet Take 81 mg by mouth once daily.      atorvastatin (LIPITOR) 80 MG tablet Take 1 tablet (80 mg total) by mouth once daily. 90 tablet 1    EPINEPHrine (EPIPEN) 0.3 mg/0.3 mL AtIn        latanoprost 0.005 % ophthalmic solution Place 1 drop into both eyes every evening.      losartan-hydrochlorothiazide 100-25 mg (HYZAAR) 100-25 mg per tablet Take 1 tablet by mouth once daily. 90 tablet 1    meloxicam (MOBIC) 15 MG tablet Take 1 tablet (15 mg total) by mouth once daily. 30 tablet 2    pantoprazole (PROTONIX) 40 MG tablet TAKE 1 TABLET(40 MG) BY MOUTH EVERY DAY 90 tablet 1     No current facility-administered medications on file prior to visit.     Review of patient's allergies indicates:  No Known Allergies    Review of Systems   Constitutional: Negative for chills, decreased appetite, fever, malaise/fatigue, night sweats, weight gain and weight loss.   Cardiovascular:  Negative for chest pain, claudication, dyspnea on exertion, leg swelling, palpitations and syncope.   Respiratory:  Negative for cough and shortness of breath.    Endocrine: Negative for cold intolerance and heat intolerance.   Hematologic/Lymphatic: Negative for bleeding problem. Does not bruise/bleed easily.   Skin:  Positive for nail changes. Negative for color change, dry skin, flushing, itching, poor wound healing, rash, skin cancer, suspicious lesions and unusual hair distribution.   Musculoskeletal:  Negative for arthritis, back pain, falls, gout, joint pain, joint swelling, muscle cramps, muscle weakness, myalgias, neck pain and stiffness.   Gastrointestinal:  Negative for diarrhea, nausea and vomiting.   Neurological:  Negative for dizziness, focal weakness, light-headedness, numbness, paresthesias, tremors, vertigo and weakness.   Psychiatric/Behavioral:  Negative for altered mental status and depression. The patient does not have insomnia.    Allergic/Immunologic: Negative.            Objective:       Vitals:    05/01/24 1425   BP: 122/67   Pulse: 67   Weight: 85.7 kg (188 lb 15 oz)   Height: 6' (1.829 m)   PainSc:   4   PainLoc: Toe   85.7 kg (188 lb 15 oz)     Physical Exam  Vitals reviewed.   Constitutional:        General: He is not in acute distress.     Appearance: He is well-developed. He is not ill-appearing, toxic-appearing or diaphoretic.      Comments: Proper supportive shoegear      Cardiovascular:      Pulses:           Dorsalis pedis pulses are 2+ on the right side and 2+ on the left side.        Posterior tibial pulses are 2+ on the right side and 2+ on the left side.   Musculoskeletal:         General: No swelling, tenderness or deformity.      Right lower leg: No edema.      Left lower leg: No edema.      Right ankle: Normal.      Right Achilles Tendon: Normal.      Left ankle: Normal.      Left Achilles Tendon: Normal.      Right foot: Decreased range of motion. No deformity.      Left foot: Decreased range of motion. No deformity.      Comments: +Mild pain with debridement   Feet:      Right foot:      Protective Sensation: 10 sites tested.  10 sites sensed.      Skin integrity: Skin breakdown present. No ulcer, blister, erythema, warmth, callus or dry skin.      Toenail Condition: Right toenails are abnormally thick. Fungal disease present.     Left foot:      Protective Sensation: 10 sites tested.  10 sites sensed.      Skin integrity: Skin breakdown present. No ulcer, blister, erythema, warmth, callus or dry skin.      Toenail Condition: Left toenails are abnormally thick. Fungal disease present.     Comments: Positive fungal thickened discolored nails with mild xerosis dermatitis to nail border proximally  Distal lysis  No acute signs of infection  Skin:     General: Skin is warm.      Capillary Refill: Capillary refill takes 2 to 3 seconds.      Coloration: Skin is not pale.      Findings: No erythema or rash.      Nails: There is no clubbing.   Neurological:      Mental Status: He is alert and oriented to person, place, and time.      Sensory: No sensory deficit.      Gait: Gait is intact.   Psychiatric:         Attention and Perception: Attention normal.         Mood and Affect: Mood normal.          Speech: Speech normal.         Behavior: Behavior normal.         Thought Content: Thought content normal.         Cognition and Memory: Cognition normal.         Judgment: Judgment normal.               Assessment:       Encounter Diagnoses   Name Primary?    Disease of nail Yes    Toe pain, bilateral          Plan:       Malcom was seen today for nail care.    Diagnoses and all orders for this visit:    Disease of nail  -     Nail Removal  -     Nail Removal    Toe pain, bilateral  -     Nail Removal  -     Nail Removal      I counseled the patient on his conditions, their implications and medical management.    -discussed options of talk local medication as well as monitoring of condition nails versus removal of nails today patient agreed to have nails removed    Discussed both procedure and postprocedure instructions in detail    Patient tolerated nail removal extremely well    Will need to monitor new nail growth in the future to see if any continued fungal treatment will be necessary  Patient to call any questions if needed            Follow up in about 2 weeks (around 5/15/2024).

## 2024-05-01 NOTE — PROCEDURES
Nail Removal    Date/Time: 5/1/2024 2:30 PM    Performed by: Sunita May DPM  Authorized by: Sunita May DPM    Consent Done?:  Yes (Written)  Time out: Immediately prior to the procedure a time out was called    Prep: patient was prepped and draped in usual sterile fashion    Location:     Location:  Left foot    Location detail:  Left big toe  Anesthesia:     Anesthesia:  Digital block    Local anesthetic:  Bupivacaine 0.5% without epinephrine and lidocaine 2% without epinephrine    Anesthetic total (ml):  3  Procedure Details:     Preparation:  Skin prepped with alcohol    Amount removed:  Complete    Wedge excision of skin of nail fold: No      Nail bed sutured?: No      Nail matrix removed:  None    Removed nail replaced and anchored: No      Dressing applied:  4x4, antibiotic ointment and gauze roll    Patient tolerance:  Patient tolerated the procedure well with no immediate complications     Obtained written consent for non-permanent removal of left great toe nail plate   Skin prepped with alcohol. Skin anesthesia with ethyl chloride followed by digital nerve block with 3 mL of 0.5% plain Marcaine/2% lidocaine plain. Nail folds were freed from the offending nail borders followed by excision of nail borders using an hemostat No remaining nail spicule noted.  Silver nitrate Wound site irrigated with NS, Triple antibiotic cream was applied then covered with gauze and secured with coban.    Nail Removal    Date/Time: 5/1/2024 2:30 PM    Performed by: Sunita May DPM  Authorized by: Sunita May DPM    Consent Done?:  Yes (Written)  Time out: Immediately prior to the procedure a time out was called    Prep: patient was prepped and draped in usual sterile fashion    Location:     Location:  Right foot    Location detail:  Right big toe  Anesthesia:     Anesthesia:  Digital block    Local anesthetic:  Bupivacaine 0.5% without epinephrine and lidocaine 2% without epinephrine    Anesthetic total (ml):   3  Procedure Details:     Preparation:  Skin prepped with alcohol    Amount removed:  Complete    Wedge excision of skin of nail fold: No      Nail bed sutured?: No      Nail matrix removed:  None    Removed nail replaced and anchored: No      Dressing applied:  4x4, antibiotic ointment and gauze roll    Patient tolerance:  Patient tolerated the procedure well with no immediate complications     Obtained written consent for non-permanent removal of right great toe nail plate  Skin prepped with alcohol. Skin anesthesia with ethyl chloride followed by digital nerve block with 3 mL of 0.5% plain Marcaine/2% lidocaine plain. Nail folds were freed from the offending nail borders followed by excision of nail borders using an hemostat. No remaining nail spicule noted. Silver nitrate Wound site irrigated with NS, Triple antibiotic cream was applied then covered with gauze and secured with coban.      Wound care discussed  F/u 2-3 weeks

## 2024-05-14 ENCOUNTER — TELEPHONE (OUTPATIENT)
Dept: PODIATRY | Facility: CLINIC | Age: 69
End: 2024-05-14
Payer: MEDICARE

## 2024-05-15 ENCOUNTER — OFFICE VISIT (OUTPATIENT)
Dept: PODIATRY | Facility: CLINIC | Age: 69
End: 2024-05-15
Payer: MEDICARE

## 2024-05-15 VITALS
WEIGHT: 188.94 LBS | BODY MASS INDEX: 25.59 KG/M2 | SYSTOLIC BLOOD PRESSURE: 153 MMHG | HEART RATE: 59 BPM | HEIGHT: 72 IN | DIASTOLIC BLOOD PRESSURE: 79 MMHG

## 2024-05-15 DIAGNOSIS — L60.9 DISEASE OF NAIL: Primary | ICD-10-CM

## 2024-05-15 PROCEDURE — 1101F PT FALLS ASSESS-DOCD LE1/YR: CPT | Mod: CPTII,S$GLB,, | Performed by: PODIATRIST

## 2024-05-15 PROCEDURE — 3288F FALL RISK ASSESSMENT DOCD: CPT | Mod: CPTII,S$GLB,, | Performed by: PODIATRIST

## 2024-05-15 PROCEDURE — 3077F SYST BP >= 140 MM HG: CPT | Mod: CPTII,S$GLB,, | Performed by: PODIATRIST

## 2024-05-15 PROCEDURE — 1160F RVW MEDS BY RX/DR IN RCRD: CPT | Mod: CPTII,S$GLB,, | Performed by: PODIATRIST

## 2024-05-15 PROCEDURE — 3078F DIAST BP <80 MM HG: CPT | Mod: CPTII,S$GLB,, | Performed by: PODIATRIST

## 2024-05-15 PROCEDURE — 99213 OFFICE O/P EST LOW 20 MIN: CPT | Mod: S$GLB,,, | Performed by: PODIATRIST

## 2024-05-15 PROCEDURE — 1126F AMNT PAIN NOTED NONE PRSNT: CPT | Mod: CPTII,S$GLB,, | Performed by: PODIATRIST

## 2024-05-15 PROCEDURE — 1159F MED LIST DOCD IN RCRD: CPT | Mod: CPTII,S$GLB,, | Performed by: PODIATRIST

## 2024-05-15 PROCEDURE — 3008F BODY MASS INDEX DOCD: CPT | Mod: CPTII,S$GLB,, | Performed by: PODIATRIST

## 2024-05-15 PROCEDURE — 99999 PR PBB SHADOW E&M-EST. PATIENT-LVL III: CPT | Mod: PBBFAC,,, | Performed by: PODIATRIST

## 2024-05-15 RX ORDER — HYDROCHLOROTHIAZIDE 12.5 MG/1
CAPSULE ORAL
COMMUNITY

## 2024-05-15 NOTE — PROGRESS NOTES
SUBJECTIVE: Patient returns to the clinic   status post nail removal procedure.  Patient has no complaints of fever chills or sweats.  no pain.        PAST MEDICAL HISTORY: Unchanged    Physical examination: General: Pt. is in no acute distress, alert and oriented x 3.    Podiatric examination: Vascular:Capillary refill time is within normal limits.    Dermatologic: Surgical site is clean without any signs of infection. minimal drainage.  No significant erythema.    IMPRESSION:  s/p   procedure with satisfactory progress no signs of infection.    PLAN:   Applied medihoney   Recommend continue 2 weeks.   Monitor nail re-growth  Discussed permanetn removal    Patient should call the clinic immediately if any signs of infection such as fever chills sweats increased redness or pain but was otherwise discharged.

## 2025-01-14 ENCOUNTER — TELEPHONE (OUTPATIENT)
Dept: CARDIOLOGY | Facility: CLINIC | Age: 70
End: 2025-01-14
Payer: MEDICARE

## 2025-02-19 ENCOUNTER — TELEPHONE (OUTPATIENT)
Dept: CARDIOLOGY | Facility: CLINIC | Age: 70
End: 2025-02-19

## 2025-04-09 ENCOUNTER — TELEPHONE (OUTPATIENT)
Dept: CARDIOLOGY | Facility: CLINIC | Age: 70
End: 2025-04-09

## 2025-04-09 NOTE — TELEPHONE ENCOUNTER
Lvm for pt in regards to r/s appt on 4/21 due Dr. Esparza not being in clinic. Asked to call us back or reach out to us through the portal to discuss dates and times to r/s.    Due to this being third attempt to reach pt in regards to r/s, appt is automatically schedule to next available appt.

## 2025-05-05 PROBLEM — G47.33 OSA (OBSTRUCTIVE SLEEP APNEA): Status: ACTIVE | Noted: 2025-05-05

## 2025-05-05 PROBLEM — K21.9 GASTROESOPHAGEAL REFLUX DISEASE: Status: ACTIVE | Noted: 2025-05-05

## 2025-05-05 PROBLEM — E78.5 DYSLIPIDEMIA: Status: ACTIVE | Noted: 2025-05-05

## 2025-05-05 PROBLEM — N40.0 BENIGN PROSTATIC HYPERPLASIA: Status: ACTIVE | Noted: 2025-05-05

## 2025-05-27 DIAGNOSIS — M25.561 RIGHT KNEE PAIN, UNSPECIFIED CHRONICITY: Primary | ICD-10-CM

## 2025-05-28 ENCOUNTER — TELEPHONE (OUTPATIENT)
Dept: PHARMACY | Facility: CLINIC | Age: 70
End: 2025-05-28
Payer: MEDICARE

## 2025-05-28 NOTE — TELEPHONE ENCOUNTER
[facility]/Population Health Chart Review & Patient Outreach Details For Medication Adherence Project    Reason for Outreach Encounter: 3rd Party payor non-compliance report ([employer], [employer], C, etc)  2.  Patient Outreach Method: Reviewed Patient Chart  3.   Medication in question: atorvastatin   LAST FILLED: 3/27/25 for 90 day supply  Hyperlipidemia Medications              atorvastatin (LIPITOR) 80 MG tablet Take 1 tablet (80 mg total) by mouth once daily.               4.  Reviewed and or Updates Made To: Patient Chart  5. Outreach Outcomes and/or actions taken: Patient filled medication and is on track to be adherent

## 2025-05-29 ENCOUNTER — HOSPITAL ENCOUNTER (OUTPATIENT)
Dept: RADIOLOGY | Facility: HOSPITAL | Age: 70
Discharge: HOME | End: 2025-05-29
Attending: ORTHOPAEDIC SURGERY
Payer: MEDICARE

## 2025-05-29 ENCOUNTER — OFFICE VISIT (OUTPATIENT)
Dept: ORTHOPEDICS | Facility: CLINIC | Age: 70
End: 2025-05-29
Payer: MEDICARE

## 2025-05-29 DIAGNOSIS — M25.561 RECURRENT PAIN OF RIGHT KNEE: ICD-10-CM

## 2025-05-29 DIAGNOSIS — M25.561 RIGHT KNEE PAIN, UNSPECIFIED CHRONICITY: ICD-10-CM

## 2025-05-29 PROCEDURE — 73562 X-RAY EXAM OF KNEE 3: CPT | Mod: TC,PO,RT

## 2025-05-29 PROCEDURE — 73560 X-RAY EXAM OF KNEE 1 OR 2: CPT | Mod: 26,59,LT, | Performed by: RADIOLOGY

## 2025-05-29 PROCEDURE — 99999 PR PBB SHADOW E&M-EST. PATIENT-LVL III: CPT | Mod: PBBFAC,,, | Performed by: ORTHOPAEDIC SURGERY

## 2025-05-29 PROCEDURE — 73562 X-RAY EXAM OF KNEE 3: CPT | Mod: 26,RT,, | Performed by: RADIOLOGY

## 2025-05-29 NOTE — PROGRESS NOTES
69 years old right knee pain for couple years time no one time traumatic event recently.  Reports pain to be 1 on the pain scale.  Reports have had injections cortisone as well as viscosupplementation in 2023 which did not provide much relief.  He has problems going up and down stairs.  Does report swelling in his knee.      Past history significant for open surgery with medial meniscectomy several decades ago    Exam shows well-healed scar about the medial-sided the knee, passively correctable varus deformity, tenderness to medial joint line, no swelling on today's exam, Lachman testing is negative     X-rays show medial joint space narrowing bone-on-bone arthritis     Assessment: Right knee arthrosis medial compartment disease     Plan:  Medial  brace, physical therapy, we did discuss with him arthroplasty possibly partial knee arthroplasty, we would like to get stress views      We performed a custom orthotic/brace fitting, adjusting and training with the patient. The patient demonstrated understanding and proper care. This was performed for 15 minutes.

## 2025-06-05 DIAGNOSIS — M25.561 RECURRENT PAIN OF RIGHT KNEE: ICD-10-CM

## 2025-06-05 DIAGNOSIS — M17.31 POST-TRAUMATIC OSTEOARTHRITIS OF RIGHT KNEE: Primary | ICD-10-CM

## 2025-06-18 ENCOUNTER — CLINICAL SUPPORT (OUTPATIENT)
Dept: REHABILITATION | Facility: HOSPITAL | Age: 70
End: 2025-06-18
Attending: ORTHOPAEDIC SURGERY
Payer: MEDICARE

## 2025-06-18 DIAGNOSIS — M25.561 RECURRENT PAIN OF RIGHT KNEE: ICD-10-CM

## 2025-06-18 PROCEDURE — 97110 THERAPEUTIC EXERCISES: CPT | Mod: PN

## 2025-06-18 PROCEDURE — 97530 THERAPEUTIC ACTIVITIES: CPT | Mod: PN

## 2025-06-18 PROCEDURE — 97161 PT EVAL LOW COMPLEX 20 MIN: CPT | Mod: PN

## 2025-06-18 NOTE — PROGRESS NOTES
Outpatient Rehab    Physical Therapy Evaluation    Patient Name: Malcom Ho  MRN: [id number]  YOB: 1955  Encounter Date: 6/18/2025    Therapy Diagnosis:   Encounter Diagnosis   Name Primary?    Recurrent pain of right knee      Physician: Spencer Davalos MD    Physician Orders: Eval and Treat  Medical Diagnosis: Recurrent pain of right knee  Surgical Diagnosis: Not applicable for this Episode   Surgical Date: Not applicable for this Episode  Days Since Last Surgery: Not applicable for this Episode    Visit # / Visits Authorized:  1 / 1  Insurance Authorization Period: 5/29/2025 to 5/29/2026  Date of Evaluation: 6/18/2025  Plan of Care Certification: 6/18/2025 to 8/14/25     Time In: 1406   Time Out: 1501  Total Time (in minutes): 55   Total Billable Time (in minutes): 55    Intake Outcome Measure for FOTO Survey    Therapist reviewed FOTO scores for Malcom Ho on 6/18/2025.   FOTO report - see Media section or FOTO account episode details.     Intake Score: 54%    Precautions:     standard      Subjective   History of Present Illness  Malcom is a 69 y.o. male who reports to physical therapy with a chief concern of R knee pain.     The patient reports a medical diagnosis of Recurrent pain of right knee.    Diagnostic tests related to this condition: X-ray.   X-Ray Details: FINDINGS:  The bones are osteopenic.  There is tricompartmental osteophyte formation in both knees.  There is severe right knee medial tibiofemoral joint space narrowing, similar to the prior study.  There is mild left knee medial tibiofemoral joint space narrowing.  No acute fracture, dislocation, or osseous destructive process appreciated.  There are intra-articular loose bodies noted within the posterior aspect of the right knee joint.  No chondrocalcinosis.  There is bilateral lateral patellar tilt.  No right knee joint effusion.  There is right quadriceps tendon insertion patellar enthesophyte formation.  There  are vascular calcifications noted in the left and right thigh.    History of Present Condition/Illness: Pt states he has difficulty with a deep knee squat, stairs.  He states his knee feels weak.  He got fitted for a knee brace, but has not received it yet.  He had some swelling, which improved with change in BP meds.  Pt does some squats for exercises, but he compensates with L LE.    Activities of Daily Living  Social history was obtained from Patient.    General Prior Level of Function Comments: I with ADLs  General Current Level of Function Comments: pain and difficulty with squats and stairs  Patient Responsibilities: Driving, Health management, Home management, Laundry, Meal prep, Personal ADL, Shopping    Previously independent with activities of daily living? Yes     Currently independent with activities of daily living? Yes          Previously independent with instrumental activities of daily living? Yes     Currently independent with instrumental activities of daily living? Yes              Pain     Patient reports a current pain level of 0/10. Pain at best is reported as 0/10. Pain at worst is reported as 4/10.   Location: L knee  Clinical Progression (since onset): Stable  Pain Qualities: Aching, Dull  Pain-Relieving Factors: Rest  Pain-Aggravating Factors: Squatting, Stair climbing, Other (Comment)  Other Pain-Aggravating Factors: prolonged walking (a couple of miles)         Treatment History  Treatments  Previously Received Treatments: No    Living Arrangements  Living Situation  Housing: Home independently  Living Arrangements: Family members  Support Systems: Family members    Home Setup  Type of Structure: House        Employment      Pt owns construction company (field visits)      Past Medical History/Physical Systems Review:   Malcom Ho  has a past medical history of Arrhythmia, GERD (gastroesophageal reflux disease), Glaucoma, Hypertension, Osteoarthritis of both hands, Rupture of left  long head biceps tendon, SOB (shortness of breath), and Urinary frequency.    Malcom Ho  has a past surgical history that includes Knee surgery (Right); Colonoscopy (2006); Colonoscopy (N/A, 11/07/2022); Sinus surgery; Uvulopalatopharyngoplasty; ANGIOGRAM, CORONARY, WITH LEFT HEART CATHETERIZATION (N/A, 2/27/2023); and fractional flow reserve measurement, myocardial (2/27/2023).    Malcom has a current medication list which includes the following prescription(s): amlodipine, aspirin, atorvastatin, epinephrine, ketorolac 0.5%, latanoprost, losartan-hydrochlorothiazide 100-25 mg, pantoprazole, and tamsulosin.    Review of patient's allergies indicates:  No Known Allergies     Objective   Posture                 Right knee position is: Genu Varum        Knee Range of Motion   Right Knee   Active (deg) Passive (deg) Pain   Flexion 125 (tightness)       Extension -2           Left Knee   Active (deg) Passive (deg) Pain   Flexion 138       Extension 3                          Hip Strength - Planes of Motion   Right Strength Right Pain Left Strength Left  Pain   Flexion (L2) 4-   4+     Extension 3+   3+     ABduction 4+   4+     ADduction 5   5     Internal Rotation           External Rotation               Knee Strength   Right Strength Right Pain Left Strength Left  Pain   Flexion (S2) 5   5     Prone Flexion           Extension (L3) 5   5                Hip Special Tests          Knee Special Tests  Negative: Right Valgus Stress at 30 Degrees, Left Valgus Stress at 30 Degrees, Left Varus Stress at 30 Degrees, and Right Varus Stress at 30 Degrees         Hamstring length: L: -20 deg, R: -30 deg        Knee Patellar Screening       Right Patellar Mobility  Normal: Medial and Lateral  Hypomobile: Superior and Inferior  Left Patellar Mobility  Normal: Medial, Lateral, Superior, and Inferior              Treatment:  Therapeutic Exercise  TE 1: SLR x10  TE 2: SL hip abd x10  TE 3: SL hip add x10  TE 4: prone hip ext  x10  Balance/Neuromuscular Re-Education  NMR 1: quad sets in long sit x10, 5 sec hold  Therapeutic Activity  TA 1: bridges x20, 5 sec hold  TA 2: sit to stand from chair x10  TA 3: sit to stand from chair with 10# KB between knees x10  TA 4: pt edu on staying hydrated to prevent muscle cramps    Time Entry(in minutes):  PT Evaluation (Low) Time Entry: 30  Neuromuscular Re-Education Time Entry: 2  Therapeutic Activity Time Entry: 10  Therapeutic Exercise Time Entry: 13    Assessment & Plan   Assessment  Malcom presents with a condition of Low complexity.   Presentation of Symptoms: Stable  Will Comorbidities Impact Care: No       Functional Limitations: Decreased ambulation distance/endurance, Functional mobility, Range of motion, Squatting, Other (Comment)  Other Functional Limitations: stairs  Impairments: Abnormal or restricted range of motion, Impaired physical strength, Pain with functional activity  Personal Factors Affecting Prognosis: Pain    Patient Goal for Therapy (PT): be able to squat and perform steps without pain; increased LE strength  Prognosis: Good  Assessment Details: Pt presents with medical dx of Recurrent pain of right knee.  He presents with decreased R knee ROM, R knee varus, decreased R LE strength.  He has some hypomobility to R patella superiorly and inferiorly.  He required cues to isolate quad and glutes.  He will benefit from PT for improved LE strength and R knee ROM for improved functional mobility.    Plan  From a physical therapy perspective, the patient would benefit from: Skilled Rehab Services    Planned therapy interventions include: Therapeutic exercise, Therapeutic activities, Neuromuscular re-education, Manual therapy, and ADLs/IADLs.    Planned modalities to include: Cryotherapy (cold pack), Electrical stimulation - passive/unattended, Thermotherapy (hot pack), and Other (Comment). dry needling      Visit Frequency: 2 times Per Week for 8 Weeks.       This plan was  discussed with Patient.   Discussion participants: Agreed Upon Plan of Care             The patient's spiritual, cultural, and educational needs were considered, and the patient is agreeable to the plan of care and goals.     Education  Education was done with Patient. The patient's learning style includes Demonstration and Listening. The patient Demonstrates understanding and Verbalizes understanding.         -role of PT -Pt educated on importance of compliance with HEP and to perform exercises in pain free ROM  Pt gave verbal understanding to all education provided         Goals:   Active       long term goals       Patient will report decreased R knee pain to 1/10 for increased ease with ADLs.        Start:  06/19/25    Expected End:  08/14/25            Patient will increase LE strength by 1 muscle grade in all deficient planes for increased ease with ADLs.        Start:  06/19/25    Expected End:  08/14/25            Patient will increase R knee extension AROM to > or = 0 deg for increased ease with ADLs.        Start:  06/19/25    Expected End:  08/14/25            Pt will be independent with HEP for self management of symptoms        Start:  06/19/25    Expected End:  08/14/25               short term goals       Patient will report decreased R knee pain to 2/10 at its worst for increased ease with ADLs.        Start:  06/19/25    Expected End:  07/17/25            Patient will increase LE strength by 1/3 muscle grade in all deficient planes for increased ease with ADLs.        Start:  06/19/25    Expected End:  07/17/25            pt will ascend/descend 8 steps without R knee pain       Start:  06/19/25    Expected End:  07/17/25                Celina Unger, PT, DPT

## 2025-06-20 ENCOUNTER — CLINICAL SUPPORT (OUTPATIENT)
Dept: REHABILITATION | Facility: HOSPITAL | Age: 70
End: 2025-06-20
Payer: MEDICARE

## 2025-06-20 DIAGNOSIS — M25.561 RECURRENT PAIN OF RIGHT KNEE: Primary | ICD-10-CM

## 2025-06-20 PROCEDURE — 97530 THERAPEUTIC ACTIVITIES: CPT | Mod: PN,CQ

## 2025-06-20 PROCEDURE — 97110 THERAPEUTIC EXERCISES: CPT | Mod: PN,CQ

## 2025-06-20 NOTE — PROGRESS NOTES
"Physical Therapy Visit    Patient Name: Malcom Ho  MRN: [id number]  YOB: 1955  Encounter Date: 6/20/2025    Therapy Diagnosis:   Encounter Diagnosis   Name Primary?    Recurrent pain of right knee Yes     Physician: Spencer Davalos MD    Physician Orders: Eval and Treat  Medical Diagnosis: Recurrent pain of right knee  Surgical Diagnosis: Not applicable for this Episode   Surgical Date: Not applicable for this Episode  Days Since Last Surgery: Not applicable for this Episode    Visit # / Visits Authorized:  1 / 10  Insurance Authorization Period: 6/16/2025 to 12/31/2025  Date of Evaluation: 6/18/2025  Plan of Care Certification: 6/19/2025 to 8/14/2025      PT/PTA: PTA   Number of PTA visits since last PT visit:1  Time In: 0804   Time Out: 0851  Total Time (in minutes): 47   Total Billable Time (in minutes): 25    FOTO:  Intake Score: 54%  Survey Score 2:  %  Survey Score 3:  %    Precautions:     standard      Subjective   Exercises going well at home, not feeling much change since evaluation. Climbing stairs seems to be most problematic.  Pain reported as 0/10. R knee    Objective            Treatment:  Therapeutic Exercise  TE 1: SLR 2x10  TE 2: SL hip abd 2x10  TE 3: SL hip add 2x10  TE 4: prone hip ext 2x10  Manual Therapy  MT 1: Patellar glides all planes to assess mobility  MT 2: tibiofemoral mobs to improve extension  Balance/Neuromuscular Re-Education  NMR 1: quad sets in long sit x10, 5 sec hold  NMR 2: Sidestep ~20 feet x 2 laps, Y loop around ankles  NMR 3: SLS 30" each  Therapeutic Activity  TA 1: bridges x20, 5 sec hold  TA 2: sit to stand from chair x10  TA 3: sit to stand from chair with 10# KB between knees 2x10  TA 4: pt edu on staying hydrated to prevent muscle cramps  TA 5: +Standing heel raise 2x10 B, then 2x10 with 75% weight shift onto R LE (unable to SL heel raise through full ROM)  TA 6: +Heel tap 8" step 2x10 each  TA 7: +Forward step up 8" box 2x10 R    Time Entry(in " minutes):  Manual Therapy Time Entry: 5  Neuromuscular Re-Education Time Entry: 8  Therapeutic Activity Time Entry: 20  Therapeutic Exercise Time Entry: 14    Assessment & Plan   Assessment: Patient presents to clinic with continued occasional discomfort to the anteromedial R knee with weight bearing activities. Challenged to achieve full ROM with R leg heel raise but was able to perform with 75% weight bearing. Good tolerance to completed exercises, will benefit from further progression as able for functional strength, endurance, and mobility.  Evaluation/Treatment Tolerance: Patient tolerated treatment well    The patient will continue to benefit from skilled outpatient physical therapy in order to address the deficits listed in the problem list on the initial evaluation, provide patient and family education, and maximize the patients level of independence in the home and community environments.     The patient's spiritual, cultural, and educational needs were considered, and the patient is agreeable to the plan of care and goals.           Plan: Continue with current POC toward established physical therapy goals.    Goals:   Active       long term goals       Patient will report decreased R knee pain to 1/10 for increased ease with ADLs.  (Progressing)       Start:  06/19/25    Expected End:  08/14/25            Patient will increase LE strength by 1 muscle grade in all deficient planes for increased ease with ADLs.  (Progressing)       Start:  06/19/25    Expected End:  08/14/25            Patient will increase R knee extension AROM to > or = 0 deg for increased ease with ADLs.  (Progressing)       Start:  06/19/25    Expected End:  08/14/25            Pt will be independent with HEP for self management of symptoms  (Progressing)       Start:  06/19/25    Expected End:  08/14/25               short term goals       Patient will report decreased R knee pain to 2/10 at its worst for increased ease with ADLs.   (Progressing)       Start:  06/19/25    Expected End:  07/17/25            Patient will increase LE strength by 1/3 muscle grade in all deficient planes for increased ease with ADLs.  (Progressing)       Start:  06/19/25    Expected End:  07/17/25            pt will ascend/descend 8 steps without R knee pain (Progressing)       Start:  06/19/25    Expected End:  07/17/25                Jenny Peralta, PTA

## 2025-06-24 ENCOUNTER — CLINICAL SUPPORT (OUTPATIENT)
Dept: REHABILITATION | Facility: HOSPITAL | Age: 70
End: 2025-06-24
Payer: MEDICARE

## 2025-06-24 DIAGNOSIS — M25.561 RECURRENT PAIN OF RIGHT KNEE: Primary | ICD-10-CM

## 2025-06-24 PROCEDURE — 97530 THERAPEUTIC ACTIVITIES: CPT | Mod: PN

## 2025-06-24 PROCEDURE — 97110 THERAPEUTIC EXERCISES: CPT | Mod: PN

## 2025-06-24 PROCEDURE — 97112 NEUROMUSCULAR REEDUCATION: CPT | Mod: PN

## 2025-06-24 NOTE — PROGRESS NOTES
"  Outpatient Rehab    Physical Therapy Visit    Patient Name: Malcom Ho  MRN: [id number]  YOB: 1955  Encounter Date: 6/24/2025    Therapy Diagnosis:   Encounter Diagnosis   Name Primary?    Recurrent pain of right knee Yes     Physician: Spencer Davalos MD    Physician Orders: Eval and Treat  Medical Diagnosis: Recurrent pain of right knee  Surgical Diagnosis: Not applicable for this Episode   Surgical Date: Not applicable for this Episode  Days Since Last Surgery: Not applicable for this Episode    Visit # / Visits Authorized:  2 / 10  Insurance Authorization Period: 6/16/2025 to 12/31/2025  Date of Evaluation: 6/18/2025  Plan of Care Certification: 6/19/2025 to 8/14/2025      PT/PTA: PT   Number of PTA visits since last PT visit:0  Time In: 0905   Time Out: 0958  Total Time (in minutes): 53   Total Billable Time (in minutes): 53    FOTO:  Intake Score: 54%  Survey Score 2:  %  Survey Score 3:  %    Precautions:     standard      Subjective   Pt states he has not had knee pain.  He was compliant with HEP..  Pain reported as 0/10. R knee    Objective            Treatment:  Therapeutic Exercise  TE 1: SLR 1x10  TE 2: NP SL hip abd 1x10  TE 3: SL hip add 1x10  TE 4: prone hip ext 1x10  TE 5: +seated HS stretch 3x20 sec ea  Balance/Neuromuscular Re-Education  NMR 1: quad sets in long sit x10, 5 sec hold  NMR 2: Sidestep on stripes (30 ft) x 2 laps, Y loop around ankles  NMR 3: SLS 2x30" each  NMR 4: +SL clam RTB 2x10 ea  NMR 5: +standing hip ext/abd 2x10 ea  Therapeutic Activity  TA 1: bridges x20, 5 sec hold, RTB for hip abd iso  TA 3: sit to stand from chair with 10# KB between knees 2x10  TA 5: +SL heel raises 2x10 ea (more challenging on R LE)  TA 7: +Forward step up 8" box + contra hip flex 2x10ea  TA 8: +shuttle 87# 2x10  TA 9: +single leg shuttle 50# 2x10 ea    Time Entry(in minutes):  Neuromuscular Re-Education Time Entry: 13  Therapeutic Activity Time Entry: 25  Therapeutic Exercise Time " Entry: 15    Assessment & Plan   Assessment: Pt is improving with increased LE strength.  He was able to perform R single leg heel raise today.  He was able to tolerate progression of closed chain exercises without exacerbation of knee pain.  He will benefit from PT for progressive LE strengthening and knee stabilization for improved functional mobility.  Evaluation/Treatment Tolerance: Patient tolerated treatment well    The patient will continue to benefit from skilled outpatient physical therapy in order to address the deficits listed in the problem list on the initial evaluation, provide patient and family education, and maximize the patients level of independence in the home and community environments.     The patient's spiritual, cultural, and educational needs were considered, and the patient is agreeable to the plan of care and goals.           Plan: Continue with current POC toward established physical therapy goals.    Goals:   Active       long term goals       Patient will report decreased R knee pain to 1/10 for increased ease with ADLs.  (Progressing)       Start:  06/19/25    Expected End:  08/14/25            Patient will increase LE strength by 1 muscle grade in all deficient planes for increased ease with ADLs.  (Progressing)       Start:  06/19/25    Expected End:  08/14/25            Patient will increase R knee extension AROM to > or = 0 deg for increased ease with ADLs.  (Progressing)       Start:  06/19/25    Expected End:  08/14/25            Pt will be independent with HEP for self management of symptoms  (Progressing)       Start:  06/19/25    Expected End:  08/14/25               short term goals       Patient will report decreased R knee pain to 2/10 at its worst for increased ease with ADLs.  (Progressing)       Start:  06/19/25    Expected End:  07/17/25            Patient will increase LE strength by 1/3 muscle grade in all deficient planes for increased ease with ADLs.  (Progressing)        Start:  06/19/25    Expected End:  07/17/25            pt will ascend/descend 8 steps without R knee pain (Progressing)       Start:  06/19/25    Expected End:  07/17/25                Celina Unger, PT, DPT

## 2025-06-26 ENCOUNTER — CLINICAL SUPPORT (OUTPATIENT)
Dept: REHABILITATION | Facility: HOSPITAL | Age: 70
End: 2025-06-26
Payer: MEDICARE

## 2025-06-26 DIAGNOSIS — M25.561 RECURRENT PAIN OF RIGHT KNEE: Primary | ICD-10-CM

## 2025-06-26 PROCEDURE — 97530 THERAPEUTIC ACTIVITIES: CPT | Mod: PN,CQ

## 2025-06-26 NOTE — PROGRESS NOTES
"  Outpatient Rehab    Physical Therapy Visit    Patient Name: Malcom Ho  MRN: [id number]  YOB: 1955  Encounter Date: 6/26/2025    Therapy Diagnosis:   Encounter Diagnosis   Name Primary?    Recurrent pain of right knee Yes     Physician: Spencer Davalos MD    Physician Orders: Eval and Treat  Medical Diagnosis: Recurrent pain of right knee  Surgical Diagnosis: Not applicable for this Episode   Surgical Date: Not applicable for this Episode  Days Since Last Surgery: Not applicable for this Episode    Visit # / Visits Authorized:  3 / 10  Insurance Authorization Period: 6/16/2025 to 12/31/2025  Date of Evaluation: 6/18/2025  Plan of Care Certification: 6/19/2025 to 8/14/2025      PT/PTA: PTA   Number of PTA visits since last PT visit:1  Time In: 1505   Time Out: 1558  Total Time (in minutes): 53   Total Billable Time (in minutes): 30    FOTO:  Intake Score: 54%  Survey Score 2:  %  Survey Score 3:  %    Precautions:     standard      Subjective   Had to do like 20 stairs in a row eariler today and caused some pain and discomfort..  Pain reported as 1/10. R knee    Objective            Treatment:  Therapeutic Exercise  TE 1: SLR 1x10  TE 3: SL hip add 1x10  TE 4: prone hip ext 1x10  TE 5: seated HS stretch 3x20 sec ea  Balance/Neuromuscular Re-Education  NMR 2: Sidestep on stripes (30 ft) x 2 laps, Y loop around ankles (maybe G loop next time)  NMR 3: SLS 2x30" each  NMR 4: SL clam RTB 2x10 ea  NMR 5: +standing hip ext/abd 2x10 ea  NMR 6: +Standing RDL taps to chair 2x10 (to 12" box next time)  Therapeutic Activity  TA 1: bridges x20, 5 sec hold, RTB for hip abd iso  TA 2: sit to stand from chair x10, 10# KB at chest  TA 3: sit to stand from chair with 10# KB between knees 2x10  TA 5: SL heel raises 2x10 ea (more challenging on R LE)  TA 6: Heel tap 8" step 2x10 each  TA 7: Forward step up 8" box + contra hip flex 2x10ea  /  Attempted 4" lateral step down, held due to significant crepitus  TA 8: " shuttle 100# 2x10  TA 9: single leg shuttle 50# 2x10 ea (try 62# next time)    Time Entry(in minutes):  Neuromuscular Re-Education Time Entry: 13  Therapeutic Activity Time Entry: 25  Therapeutic Exercise Time Entry: 15    Assessment & Plan   Assessment: Patient continues with occasional discomfort to B knees with notable crepitus. Unable to perform lateral step down due to pain. Able to perform continued progression for LE strength and stability, progressed closed chain activities for posterior chain. Pt will benefit from further progression of LE strength and knee stabilization to improve functional mobility.  Evaluation/Treatment Tolerance: Patient tolerated treatment well    The patient will continue to benefit from skilled outpatient physical therapy in order to address the deficits listed in the problem list on the initial evaluation, provide patient and family education, and maximize the patients level of independence in the home and community environments.     The patient's spiritual, cultural, and educational needs were considered, and the patient is agreeable to the plan of care and goals.           Plan: Continue with current POC toward established physical therapy goals.    Goals:   Active       long term goals       Patient will report decreased R knee pain to 1/10 for increased ease with ADLs.  (Progressing)       Start:  06/19/25    Expected End:  08/14/25            Patient will increase LE strength by 1 muscle grade in all deficient planes for increased ease with ADLs.  (Progressing)       Start:  06/19/25    Expected End:  08/14/25            Patient will increase R knee extension AROM to > or = 0 deg for increased ease with ADLs.  (Progressing)       Start:  06/19/25    Expected End:  08/14/25            Pt will be independent with HEP for self management of symptoms  (Progressing)       Start:  06/19/25    Expected End:  08/14/25               short term goals       Patient will report decreased  R knee pain to 2/10 at its worst for increased ease with ADLs.  (Progressing)       Start:  06/19/25    Expected End:  07/17/25            Patient will increase LE strength by 1/3 muscle grade in all deficient planes for increased ease with ADLs.  (Progressing)       Start:  06/19/25    Expected End:  07/17/25            pt will ascend/descend 8 steps without R knee pain (Progressing)       Start:  06/19/25    Expected End:  07/17/25                Jenny Peralta PTA

## 2025-07-10 ENCOUNTER — CLINICAL SUPPORT (OUTPATIENT)
Dept: REHABILITATION | Facility: HOSPITAL | Age: 70
End: 2025-07-10
Payer: MEDICARE

## 2025-07-10 DIAGNOSIS — M25.561 RECURRENT PAIN OF RIGHT KNEE: Primary | ICD-10-CM

## 2025-07-10 PROCEDURE — 97112 NEUROMUSCULAR REEDUCATION: CPT | Mod: PN

## 2025-07-10 PROCEDURE — 97530 THERAPEUTIC ACTIVITIES: CPT | Mod: PN

## 2025-07-10 PROCEDURE — 97110 THERAPEUTIC EXERCISES: CPT | Mod: PN

## 2025-07-10 NOTE — PROGRESS NOTES
"  Outpatient Rehab    Physical Therapy Visit    Patient Name: Malcom Ho  MRN: [id number]  YOB: 1955  Encounter Date: 7/10/2025    Therapy Diagnosis:   Encounter Diagnosis   Name Primary?    Recurrent pain of right knee Yes     Physician: Spencer Davalos MD    Physician Orders: Eval and Treat  Medical Diagnosis: Recurrent pain of right knee  Surgical Diagnosis: Not applicable for this Episode   Surgical Date: Not applicable for this Episode  Days Since Last Surgery: Not applicable for this Episode    Visit # / Visits Authorized:  4 / 10  Insurance Authorization Period: 6/16/2025 to 12/31/2025  Date of Evaluation: 6/18/2025  Plan of Care Certification: 6/19/2025 to 8/14/2025      PT/PTA: PT   Number of PTA visits since last PT visit:0  Time In: 0310   Time Out: 0403  Total Time (in minutes): 53   Total Billable Time (in minutes): 53    FOTO:  Intake Score: 54%  Survey Score 2:  %  Survey Score 3:  %    Precautions:     standard      Subjective   Pt states he had some pain to R knee with stairs..  Pain reported as 0/10. R knee    Objective            Treatment:  Therapeutic Exercise  TE 5: seated HS stretch 3x20 sec ea  TE 6: +upright bike seat 7, L3.9 x5 min  Balance/Neuromuscular Re-Education  NMR 2: Sidestep on stripes (30 ft) x 2 laps, Y loop around ankles (maybe G loop next time)  NMR 3: SLS 2x30" each  NMR 4: SL clam Y loop 2x10 ea  NMR 5: standing hip ext/abd + YTB 2x10 ea  NMR 6: Standing RDL taps to +12in box 2x10  Therapeutic Activity  TA 1: bridges x20, 5 sec hold, Y loop for hip abd iso  TA 2: sit to stand from chair 2x10, 10# KB at chest  TA 3: sit to stand from chair with 20# KB between knees 2x10  TA 5: SL heel raises 2x10 ea (more challenging on R LE)  TA 6: Heel tap 8" step 2x10 each (d/c next visit)  TA 7: Forward step up 8" box + contra hip flex 2x10ea  /   lateral step up to R 6in 2x10  TA 8: shuttle 100# 2x10  TA 9: single leg shuttle 62# 2x10 ea    Time Entry(in " minutes):  Neuromuscular Re-Education Time Entry: 13  Therapeutic Activity Time Entry: 25  Therapeutic Exercise Time Entry: 15    Assessment & Plan   Assessment: Pt was able to tolerate progressions for LE strengthening.  He requires cues to activate glutes.  Pt reported decreased knee pain with cyclical motion of bike.  Step ups were more difficult on R due to crepitus and some pain to knee. Pt will benefit from further progression of LE strength and knee stabilization to improve functional mobility.  Evaluation/Treatment Tolerance: Patient tolerated treatment well    The patient will continue to benefit from skilled outpatient physical therapy in order to address the deficits listed in the problem list on the initial evaluation, provide patient and family education, and maximize the patients level of independence in the home and community environments.     The patient's spiritual, cultural, and educational needs were considered, and the patient is agreeable to the plan of care and goals.     Education  Education was done with Patient. The patient's learning style includes Demonstration and Listening. The patient Demonstrates understanding and Verbalizes understanding.         May use stationary bike       Plan: Continue with current POC toward established physical therapy goals.    Goals:   Active       long term goals       Patient will report decreased R knee pain to 1/10 for increased ease with ADLs.  (Progressing)       Start:  06/19/25    Expected End:  08/14/25            Patient will increase LE strength by 1 muscle grade in all deficient planes for increased ease with ADLs.  (Progressing)       Start:  06/19/25    Expected End:  08/14/25            Patient will increase R knee extension AROM to > or = 0 deg for increased ease with ADLs.  (Progressing)       Start:  06/19/25    Expected End:  08/14/25            Pt will be independent with HEP for self management of symptoms  (Progressing)       Start:   06/19/25    Expected End:  08/14/25               short term goals       Patient will report decreased R knee pain to 2/10 at its worst for increased ease with ADLs.  (Progressing)       Start:  06/19/25    Expected End:  07/17/25            Patient will increase LE strength by 1/3 muscle grade in all deficient planes for increased ease with ADLs.  (Progressing)       Start:  06/19/25    Expected End:  07/17/25            pt will ascend/descend 8 steps without R knee pain (Progressing)       Start:  06/19/25    Expected End:  07/17/25                Celina Unger, PT, DPT

## 2025-07-17 ENCOUNTER — CLINICAL SUPPORT (OUTPATIENT)
Dept: REHABILITATION | Facility: HOSPITAL | Age: 70
End: 2025-07-17
Payer: MEDICARE

## 2025-07-17 DIAGNOSIS — M25.561 RECURRENT PAIN OF RIGHT KNEE: Primary | ICD-10-CM

## 2025-07-17 PROCEDURE — 97530 THERAPEUTIC ACTIVITIES: CPT | Mod: PN,CQ

## 2025-07-17 NOTE — PROGRESS NOTES
"Physical Therapy Visit    Patient Name: Malcom Ho  MRN: [id number]  YOB: 1955  Encounter Date: 7/17/2025    Therapy Diagnosis:   Encounter Diagnosis   Name Primary?    Recurrent pain of right knee Yes     Physician: Spencer Davalos MD    Physician Orders: Eval and Treat  Medical Diagnosis: Recurrent pain of right knee  Surgical Diagnosis: Not applicable for this Episode   Surgical Date: Not applicable for this Episode  Days Since Last Surgery: Not applicable for this Episode    Visit # / Visits Authorized:  5 / 10  Insurance Authorization Period: 6/16/2025 to 12/31/2025  Date of Evaluation: 6/18/2025  Plan of Care Certification: 6/19/2025 to 8/14/2025      PT/PTA: PTA   Number of PTA visits since last PT visit:1  Time In: 1505   Time Out: 1600  Total Time (in minutes): 55   Total Billable Time (in minutes): 30    FOTO:  Intake Score: 54%  Survey Score 2: 59%  Survey Score 3: Not applicable for this Episode%    Precautions:  Additional Precautions and Protocol Details: standard      Subjective   Got a brace to the R knee but has not noticed benefit yet. Not currently having any pain..  Pain reported as 0/10. R knee    Objective            Treatment:  Therapeutic Exercise  TE 5: seated HS stretch 3x20 sec ea  TE 6: upright bike seat 7, L4 x5 min  Balance/Neuromuscular Re-Education  NMR 2: Sidestep on stripes (30 ft) x 2 laps, Y loop around ankles (maybe G loop next time)  NMR 3: SLS 2x30" each (Simultaneous filing. User may not have seen previous data.)  NMR 4: SL clam Y loop 2x10 ea  NMR 5: standing hip ext/abd + YTB 2x10 ea  NMR 6: Standing RDL taps to +8in box with 5# KB 2x10 each  Therapeutic Activity  TA 1: bridges x20, 5 sec hold, Y loop for hip abd iso; Bridge with alternating march x 10 each for 4 rounds  TA 2: sit to stand from chair 2x10, 20# KB at chest  TA 3: sit to stand from chair with 20# KB between knees 2x10  TA 5: SL heel raises 2x10 ea (more challenging on R LE)  TA 6: " "Empty  TA 7: Forward step up 8" box + contra hip flex 2x10ea  /   lateral step up to R 6in 2x10  TA 8: shuttle 100# 2x10  TA 9: single leg shuttle 62# 2x10 ea  TA 10: Shuttle Kick Back 12# 2x10 each (25# next time)    Time Entry(in minutes):  Neuromuscular Re-Education Time Entry: 15  Therapeutic Activity Time Entry: 25  Therapeutic Exercise Time Entry: 15    Assessment & Plan   Assessment: Patient with good tolerance to progression of activities, no knee pain provocation throughout session. Brace worn with weight bearing activities for supplemental lateral knee support. Will benefit from further progression for LE strength, knee stability, and tolerance to functional activities.  Evaluation/Treatment Tolerance: Patient tolerated treatment well    The patient will continue to benefit from skilled outpatient physical therapy in order to address the deficits listed in the problem list on the initial evaluation, provide patient and family education, and maximize the patients level of independence in the home and community environments.     The patient's spiritual, cultural, and educational needs were considered, and the patient is agreeable to the plan of care and goals.           Plan: Continue with current POC toward established physical therapy goals.    Goals:   Active       long term goals       Patient will report decreased R knee pain to 1/10 for increased ease with ADLs.  (Progressing)       Start:  06/19/25    Expected End:  08/14/25            Patient will increase LE strength by 1 muscle grade in all deficient planes for increased ease with ADLs.  (Progressing)       Start:  06/19/25    Expected End:  08/14/25            Patient will increase R knee extension AROM to > or = 0 deg for increased ease with ADLs.  (Progressing)       Start:  06/19/25    Expected End:  08/14/25            Pt will be independent with HEP for self management of symptoms  (Progressing)       Start:  06/19/25    Expected End:  " 08/14/25               short term goals       Patient will report decreased R knee pain to 2/10 at its worst for increased ease with ADLs.  (Progressing)       Start:  06/19/25    Expected End:  07/17/25            Patient will increase LE strength by 1/3 muscle grade in all deficient planes for increased ease with ADLs.  (Progressing)       Start:  06/19/25    Expected End:  07/17/25            pt will ascend/descend 8 steps without R knee pain (Progressing)       Start:  06/19/25    Expected End:  07/17/25                Jenny Peralta, PTA

## 2025-08-04 PROBLEM — M25.561 RECURRENT PAIN OF RIGHT KNEE: Status: RESOLVED | Noted: 2025-06-20 | Resolved: 2025-08-04

## 2025-08-13 ENCOUNTER — CLINICAL SUPPORT (OUTPATIENT)
Dept: REHABILITATION | Facility: HOSPITAL | Age: 70
End: 2025-08-13
Payer: MEDICARE

## 2025-08-13 DIAGNOSIS — M25.60 DECREASED RANGE OF MOTION: Primary | ICD-10-CM

## 2025-08-13 DIAGNOSIS — M62.81 MUSCLE WEAKNESS: ICD-10-CM

## 2025-08-13 PROCEDURE — 97530 THERAPEUTIC ACTIVITIES: CPT | Mod: PN

## 2025-08-13 PROCEDURE — 97112 NEUROMUSCULAR REEDUCATION: CPT | Mod: PN
